# Patient Record
Sex: MALE | Race: ASIAN | NOT HISPANIC OR LATINO | URBAN - METROPOLITAN AREA
[De-identification: names, ages, dates, MRNs, and addresses within clinical notes are randomized per-mention and may not be internally consistent; named-entity substitution may affect disease eponyms.]

---

## 2017-11-13 ENCOUNTER — EMERGENCY (EMERGENCY)
Facility: HOSPITAL | Age: 22
LOS: 1 days | Discharge: HOME | End: 2017-11-13
Attending: EMERGENCY MEDICINE | Admitting: EMERGENCY MEDICINE
Payer: MEDICAID

## 2017-11-13 VITALS
RESPIRATION RATE: 14 BRPM | OXYGEN SATURATION: 100 % | TEMPERATURE: 98 F | HEART RATE: 63 BPM | SYSTOLIC BLOOD PRESSURE: 127 MMHG | DIASTOLIC BLOOD PRESSURE: 67 MMHG

## 2017-11-13 PROCEDURE — 12002 RPR S/N/AX/GEN/TRNK2.6-7.5CM: CPT

## 2017-11-13 PROCEDURE — 99284 EMERGENCY DEPT VISIT MOD MDM: CPT | Mod: 25

## 2017-11-13 RX ORDER — TETANUS TOXOID, REDUCED DIPHTHERIA TOXOID AND ACELLULAR PERTUSSIS VACCINE, ADSORBED 5; 2.5; 8; 8; 2.5 [IU]/.5ML; [IU]/.5ML; UG/.5ML; UG/.5ML; UG/.5ML
0.5 SUSPENSION INTRAMUSCULAR ONCE
Qty: 0 | Refills: 0 | Status: COMPLETED | OUTPATIENT
Start: 2017-11-13 | End: 2017-11-13

## 2017-11-13 RX ORDER — CEPHALEXIN 500 MG
1 CAPSULE ORAL
Qty: 10 | Refills: 0 | OUTPATIENT
Start: 2017-11-13 | End: 2017-11-18

## 2017-11-13 RX ORDER — CEPHALEXIN 500 MG
500 CAPSULE ORAL ONCE
Qty: 0 | Refills: 0 | Status: COMPLETED | OUTPATIENT
Start: 2017-11-13 | End: 2017-11-13

## 2017-11-13 RX ORDER — IBUPROFEN 200 MG
600 TABLET ORAL ONCE
Qty: 0 | Refills: 0 | Status: COMPLETED | OUTPATIENT
Start: 2017-11-13 | End: 2017-11-13

## 2017-11-13 RX ADMIN — TETANUS TOXOID, REDUCED DIPHTHERIA TOXOID AND ACELLULAR PERTUSSIS VACCINE, ADSORBED 0.5 MILLILITER(S): 5; 2.5; 8; 8; 2.5 SUSPENSION INTRAMUSCULAR at 12:33

## 2017-11-13 RX ADMIN — Medication 500 MILLIGRAM(S): at 13:05

## 2017-11-13 RX ADMIN — Medication 600 MILLIGRAM(S): at 13:05

## 2017-11-13 RX ADMIN — Medication 600 MILLIGRAM(S): at 12:33

## 2017-11-13 NOTE — ED PROVIDER NOTE - PROGRESS NOTE DETAILS
ENID ZHOU Tiberio- left 2nd digit volar surface with three 5-0 sutures placed under sterile technique. No complications. Patient tolerated well. Heavily irrigated with NS, numbed with lido 1%.  DC home with Keflex prophylaxis due to nature of wound, wound care instructions. Tdap administered. Patient to return in 7 days for suture removal.

## 2017-11-13 NOTE — ED PROVIDER NOTE - CARE PLAN
Principal Discharge DX:	Laceration of finger, left, initial encounter  Instructions for follow-up, activity and diet:	Follow up with your PMD within 48-72 hours for wound check. Keep sutures covered and dry for 24 hours then clean with soap and water daily.  Apply bacitracin and cover.  Return to ED for suture removal 7 days. Take keflex 500mg 1 tab 2x/day for 5 days to prevent infection. Take Tylenol 650mg every 4-6 hours as needed for pain. Any increased pain, redness, streaking (red lines), swelling, fever, chills OR ANY NEW CONCERNING SYMPTOMS return to the emergency department. You received a Tdap shot today (Tetanus). Principal Discharge DX:	Laceration of finger, left, initial encounter  Instructions for follow-up, activity and diet:	Follow up with your PMD within 48-72 hours for wound check. Keep sutures covered and dry for 24 hours then clean with soap and water daily.  Apply bacitracin and cover.  Return to ED for suture removal 7 days. Take keflex 500mg 1 tab 2x/day for 5 days to prevent infection. Take Tylenol 650mg every 4-6 hours as needed for pain. Any increased pain, redness, streaking (red lines), swelling, fever, chills OR ANY NEW CONCERNING SYMPTOMS return to the emergency department. You received a Tdap shot today (Tetanus).  Secondary Diagnosis:	Finger pain, left

## 2017-11-13 NOTE — ED PROVIDER NOTE - MUSCULOSKELETAL, MLM
Spine appears normal, range of motion is not limited, no muscle or joint tenderness except as noted by laceration.

## 2017-11-13 NOTE — ED PROVIDER NOTE - SKIN WOUND DESCRIPTION
left 2nd digit w/ semi linear laceration overlying the volar DIP, appears dirty, no active bleeding, sensation intact, pulses normal, cap refill less than 2 seconds left 2nd digit w/ semi linear 1.5cm laceration overlying the volar Distal Phalanx, appears dirty, no active bleeding, sensation intact, pulses normal, cap refill less than 2 seconds, tendon function intact left 2nd digit w/ semi linear 3cm laceration overlying the volar Distal Phalanx, edges superficial, appears dirty, no active bleeding, sensation intact, pulses normal, cap refill less than 2 seconds, tendon function intact, no tendon or bone visible

## 2017-11-13 NOTE — ED PROVIDER NOTE - PLAN OF CARE
Follow up with your PMD within 48-72 hours for wound check. Keep sutures covered and dry for 24 hours then clean with soap and water daily.  Apply bacitracin and cover.  Return to ED for suture removal 7 days. Take keflex 500mg 1 tab 2x/day for 5 days to prevent infection. Take Tylenol 650mg every 4-6 hours as needed for pain. Any increased pain, redness, streaking (red lines), swelling, fever, chills OR ANY NEW CONCERNING SYMPTOMS return to the emergency department. You received a Tdap shot today (Tetanus).

## 2017-11-13 NOTE — ED PROVIDER NOTE - OBJECTIVE STATEMENT
21 y/o M w/ no significant PMHx, presents to the ED c/o left 2nd digit lac s/p trauma at work. Pt states he was changing a tire and sustained lac on metal caliber. Pt works as . Denies fever, chills or any other complaints. NKDA. Tetanus not UTD. 21 y/o M w/ no significant PMHx, presents to the ED c/o left 2nd digit lac s/p trauma at work. Pt states he was changing a tire and sustained lac on metal caliber. Bled on initial injury, achieved hemostasis prior to ed eval. No limit to rom, pain localized to region of lac, Pt works as . Denies fever, chills, numbness, or any other complaints. NKDA. Tetanus not UTD.

## 2017-11-16 RX ORDER — CEPHALEXIN 500 MG
1 CAPSULE ORAL
Qty: 10 | Refills: 0 | OUTPATIENT
Start: 2017-11-16 | End: 2017-11-21

## 2017-11-20 ENCOUNTER — EMERGENCY (EMERGENCY)
Facility: HOSPITAL | Age: 22
LOS: 1 days | Discharge: HOME | End: 2017-11-20
Attending: EMERGENCY MEDICINE | Admitting: EMERGENCY MEDICINE

## 2017-11-20 VITALS
RESPIRATION RATE: 18 BRPM | HEART RATE: 76 BPM | SYSTOLIC BLOOD PRESSURE: 133 MMHG | TEMPERATURE: 99 F | DIASTOLIC BLOOD PRESSURE: 60 MMHG

## 2017-11-20 NOTE — ED PROVIDER NOTE - OBJECTIVE STATEMENT
23 y/o M w/ no significant PMHx, presents to the ED for suture removal. Pt was seen in ED on 11/13/17 for left 2nd digit laceration sustained while changing tire at work. Pt had 3 sutures placed and instructed to return today for removal. Tetanus UTD. Denies fever, chills, discharge or any other complaints. NKDA.

## 2017-11-20 NOTE — ED PROVIDER NOTE - MEDICAL DECISION MAKING DETAILS
21 y/o M presents to the ED for suture removal. No signs of infection. Sutures on pad of finger, not overlying joint space so 7 days is efficient for healing. Will remove sutures and dc home.

## 2017-11-20 NOTE — ED PROVIDER NOTE - SKIN WOUND DESCRIPTION
sutures c/d/i on left index finger, no signs of infection, no erythema, no drainage, sutures don't overly joint space sutures clean, dry and intact on left index finger, no signs of infection, no erythema, no drainage, sutures don't overly joint space

## 2017-12-23 ENCOUNTER — EMERGENCY (EMERGENCY)
Facility: HOSPITAL | Age: 22
LOS: 1 days | Discharge: HOME | End: 2017-12-23
Attending: EMERGENCY MEDICINE | Admitting: EMERGENCY MEDICINE
Payer: MEDICAID

## 2017-12-23 VITALS
DIASTOLIC BLOOD PRESSURE: 90 MMHG | RESPIRATION RATE: 20 BRPM | SYSTOLIC BLOOD PRESSURE: 150 MMHG | OXYGEN SATURATION: 99 % | HEART RATE: 76 BPM

## 2017-12-23 PROCEDURE — 99283 EMERGENCY DEPT VISIT LOW MDM: CPT

## 2017-12-23 RX ORDER — METRONIDAZOLE 500 MG
1 TABLET ORAL
Qty: 30 | Refills: 0 | OUTPATIENT
Start: 2017-12-23 | End: 2018-01-01

## 2017-12-23 RX ORDER — METRONIDAZOLE 500 MG
500 TABLET ORAL ONCE
Qty: 0 | Refills: 0 | Status: COMPLETED | OUTPATIENT
Start: 2017-12-23 | End: 2017-12-23

## 2017-12-23 RX ADMIN — Medication 500 MILLIGRAM(S): at 15:03

## 2017-12-23 NOTE — ED PROVIDER NOTE - OBJECTIVE STATEMENT
[age] yo M, c/o 1wk nonbloody, watery stools.  Decreased appetite.  No abdominal pain.  No F/C.  No MHx.  Intensity:  8 BMs/day.  Quality:  yellowish/brown.  Associated Sx:  some abdominal cramping after eating food, which seems to cause the diarrhea to worsen.  No difficulty tolerating PO liquids.

## 2017-12-23 NOTE — ED PROVIDER NOTE - MEDICAL DECISION MAKING DETAILS
nonbloody diarrhea in adult M, recent PO keflex use for finger laceration, no severe abd pain, normal VS, and no evidence of significant dehydration.  HPI concerning for c.diff., Plan:  empiric Tx with flagyl for possible c.diff, avoid loperamide, as this may worsen symptoms.

## 2019-09-06 ENCOUNTER — EMERGENCY (EMERGENCY)
Facility: HOSPITAL | Age: 24
LOS: 1 days | Discharge: HOME | End: 2019-09-06
Attending: EMERGENCY MEDICINE | Admitting: EMERGENCY MEDICINE
Payer: MEDICAID

## 2019-09-06 VITALS
OXYGEN SATURATION: 97 % | HEART RATE: 94 BPM | DIASTOLIC BLOOD PRESSURE: 84 MMHG | TEMPERATURE: 99 F | RESPIRATION RATE: 18 BRPM | SYSTOLIC BLOOD PRESSURE: 124 MMHG

## 2019-09-06 PROCEDURE — 99284 EMERGENCY DEPT VISIT MOD MDM: CPT

## 2019-09-06 RX ORDER — FAMOTIDINE 10 MG/ML
20 INJECTION INTRAVENOUS ONCE
Refills: 0 | Status: COMPLETED | OUTPATIENT
Start: 2019-09-06 | End: 2019-09-06

## 2019-09-06 RX ORDER — IBUPROFEN 200 MG
600 TABLET ORAL ONCE
Refills: 0 | Status: COMPLETED | OUTPATIENT
Start: 2019-09-06 | End: 2019-09-06

## 2019-09-06 RX ORDER — ONDANSETRON 8 MG/1
4 TABLET, FILM COATED ORAL ONCE
Refills: 0 | Status: COMPLETED | OUTPATIENT
Start: 2019-09-06 | End: 2019-09-06

## 2019-09-06 RX ORDER — SODIUM CHLORIDE 9 MG/ML
1000 INJECTION, SOLUTION INTRAVENOUS ONCE
Refills: 0 | Status: COMPLETED | OUTPATIENT
Start: 2019-09-06 | End: 2019-09-06

## 2019-09-06 RX ORDER — ONDANSETRON 8 MG/1
1 TABLET, FILM COATED ORAL
Qty: 15 | Refills: 0
Start: 2019-09-06

## 2019-09-06 RX ADMIN — ONDANSETRON 4 MILLIGRAM(S): 8 TABLET, FILM COATED ORAL at 09:17

## 2019-09-06 RX ADMIN — Medication 600 MILLIGRAM(S): at 09:17

## 2019-09-06 RX ADMIN — SODIUM CHLORIDE 1000 MILLILITER(S): 9 INJECTION, SOLUTION INTRAVENOUS at 09:17

## 2019-09-06 RX ADMIN — FAMOTIDINE 20 MILLIGRAM(S): 10 INJECTION INTRAVENOUS at 09:17

## 2019-09-06 NOTE — ED ADULT NURSE NOTE - NSIMPLEMENTINTERV_GEN_ALL_ED
Implemented All Universal Safety Interventions:  [place] to call system. Call bell, personal items and telephone within reach. Instruct patient to call for assistance. Room bathroom lighting operational. Non-slip footwear when patient is off stretcher. Physically safe environment: no spills, clutter or unnecessary equipment. Stretcher in lowest position, wheels locked, appropriate side rails in place.

## 2019-09-06 NOTE — ED PROVIDER NOTE - PHYSICAL EXAMINATION
VITAL SIGNS: I have reviewed nursing notes and confirm.  CONSTITUTIONAL: non-toxic, well appearing  SKIN: no rash, no petechiae.  EYES: PERRL, EOMI, pink conjunctiva, anicteric  ENT: tongue and uvular midline, no exudates, mucous membranes moderately dry  NECK: Supple; no meningismus, no JVD  CARD: RRR, no murmurs, equal radial pulses bilaterally 2+  RESP: CTAB, no respiratory distress  ABD: Soft, non-tender, non-distended, no peritoneal signs, no CVA tenderness  EXT: Normal ROM x4. No edema. No calf tenderness  NEURO: Alert, oriented. CN2-12 intact, equal strength bilaterally  PSYCH: Cooperative, appropriate.

## 2019-09-06 NOTE — ED PROVIDER NOTE - NSFOLLOWUPINSTRUCTIONS_ED_ALL_ED_FT
Rest and drink plenty of fluids.    Please use 650-1000mg Tylenol (also called acetaminophen) every 6 hours and/or 400-600mg Motrin (also called Advil or ibuprofen) every 6 hours as needed for pain/discomfort/swelling. You can get these without a prescription. Don't use more than 3000mg of Tylenol in any 24-hour period. Make sure your other prescription/over-the-counter medications don't contain any Tylenol so you don't take too much.     Follow up with your primary care physician this week.    Take prescription ondansetron (Zofran) as needed for nausea.     Return immediately with any new or worsening symptoms including abdominal pain, unable to tolerate food or liquids, chest pain, shortness of breath, palpitations, lightheadedness, weakness, severe headache, or any additional concerns.

## 2019-09-06 NOTE — ED ADULT NURSE NOTE - OBJECTIVE STATEMENT
pt received to room intake 3 pt headaches, body aches, chills, cough, generalized abdominal discomfort, inappetence, nausea but no vomiting. pt VSS 20 g placed in L AC no blood work needed at this time. meds ordered and given awaiting further orders Will continue to monitor the pt

## 2019-09-06 NOTE — ED PROVIDER NOTE - CLINICAL SUMMARY MEDICAL DECISION MAKING FREE TEXT BOX
24 year old with no pertinent PMH presents with nausea, vomiting, and diarrhea x 2 days. Likely viral syndrome as multiple mucous membranes involved with subjective fever and body aches with no signs of any focal bacterial infection. No indication for antibiotics at this time. Plan includes IV fluids, symptomatic treatment with ibuprofen and zofran and reassessment with likely discharge.

## 2019-09-06 NOTE — ED PROVIDER NOTE - NS ED ROS FT
Review of Systems    Constitutional: (+) fever, (+) chills  HEENT: (-) dysphagia, (-) hoarseness, (+) nasal congestion  Cardiovascular: (-) chest pain, (-) syncope  Respiratory: (+) cough, (-) shortness of breath  Gastrointestinal: (+) vomiting, (+) diarrhea, (-) abdominal pain  Musculoskeletal: (-) neck pain, (-) back pain, (-) joint pain  Integumentary: (-) rash, (-) edema, (-) wound  Neurological: (+) headache, (-) altered mental status    Except as documented in the HPI, all other systems are negative.

## 2019-09-06 NOTE — ED PROVIDER NOTE - NS HIV RISK FACTOR YES
Recommendations in caring for Camilo:    Headaches--  1. Will set up with neurologist at \[facility]"" [facility].    2. Recommend abortive therapy with  for severe headaches. I will ask Dr. Marc. Do not use more than 2-3 times per week. If headache frequency is great than that, call to discuss starting a preventative mediation.   3. Follow healthy headache habits: drink lots of water (enough to have colorless urine), eat leafy green vegetables and MTV, add magnesium 200 mg and riboflavin 200 mg, obtain at least 8-9 hours of sleep.   4. Recommend regular aerobic activity to reduce stress.   5. Recommend adding breakfast with protein and fat (Susi, Zone, Jerry [employer] Marcus[employer], Balance, Kalin).   6. amitriptyline (ELAVIL) 50 mg. May increase weekly by 25 mg up to 100 mg daily (max 1.7 mg/kg/day).   7. Call if Camilo develops headaches that wake him/her from sleep or develops any worrisome symptoms.     Seborrheic dermatitis--  Recommended ketoconazole cream 2 times daily for at last 4 weeks.   May use topical steroid 1-2 times daily as needed for redness.         
Declined

## 2019-09-06 NOTE — ED PROVIDER NOTE - OBJECTIVE STATEMENT
24 year old with no pertinent PMH presents with nausea, vomiting, and diarrhea x 2 days.  Pt reports 2 episodes of nonbloody nonbilious emesis and 3 episodes of watery diarrhea since yesterday.  Pt also reports subjective fever, nasal congestion, sweating, body aches, and dry cough x 3-4 days.  Denies any chest pain, shortness of breath, sore throat, photophobia, neck pain, abdominal pain, dysuria, or rash.  Tolerating PO liquids, but reports decreased solid food intake secondary to decreased appetite. Denies any pain with oral intake, bloody stools, black tarry stools, or hematemesis. Denies any ill contacts. Denies any recent travel. Denies any antibiotic use. Pt reports taking DayQuil and ibuprofen with temporary relief. Last dose of DayQuil approximately 2 hours ago. Denies any additional complaints.

## 2019-09-06 NOTE — ED PROVIDER NOTE - PATIENT PORTAL LINK FT
You can access the FollowMyHealth Patient Portal offered by [facility] by registering at the following website: [email or web address]. By joining [employer]’s FollowMyHealth portal, you will also be able to view your health information using other applications (apps) compatible with our system.

## 2019-09-06 NOTE — ED ADULT TRIAGE NOTE - CHIEF COMPLAINT QUOTE
Pt arrives c/o multiple medical complaints - headaches, body aches, chills, cough, generalized abdominal discomfort, inappetence, nausea but no vomiting.  Pt w/o known sick contacts or recent travel.  Vitally stable in triage.  No significant PMHx.

## 2019-09-06 NOTE — ED PROVIDER NOTE - ATTENDING CONTRIBUTION TO CARE
Dr. Mendoza: 23 yo male with no sig PMH in ED with 2 days of body aches, subjective fever, nasal congestion and N/V/D.  No CP/SOB or sore throat.  No known sick contacts.  Pt states he is eating less than usual but still able to eat without vomiting.  On exam pt overall well appearing, in NAD, heart RRR, lungs CTAB, abd NTND, extremities without swelling, strength 5/5 in all extremities and skin without rash.  No CVAT bilaterally.

## 2019-09-06 NOTE — ED PROVIDER NOTE - PROGRESS NOTE DETAILS
Bert-PGY1: pt seen and re-evaluated at bedside.  Pt states his symptoms have improved.  Pt comfortable in NAD.  Pt able to tolerate PO without vomiting.  Will prepare for DC.

## 2019-09-07 ENCOUNTER — EMERGENCY (EMERGENCY)
Facility: HOSPITAL | Age: 24
LOS: 1 days | Discharge: HOME | End: 2019-09-07
Attending: EMERGENCY MEDICINE
Payer: MEDICAID

## 2019-09-07 VITALS
DIASTOLIC BLOOD PRESSURE: 78 MMHG | WEIGHT: 149.91 LBS | SYSTOLIC BLOOD PRESSURE: 127 MMHG | OXYGEN SATURATION: 100 % | RESPIRATION RATE: 16 BRPM | HEIGHT: 67 IN | TEMPERATURE: 98 F | HEART RATE: 82 BPM

## 2019-09-07 PROCEDURE — 99282 EMERGENCY DEPT VISIT SF MDM: CPT

## 2019-09-07 PROCEDURE — 99283 EMERGENCY DEPT VISIT LOW MDM: CPT

## 2019-09-07 NOTE — ED PROVIDER NOTE - NSFOLLOWUPINSTRUCTIONS_ED_ALL_ED_FT
You may have post concussive syndrome.    Follow up with the concussion clinic.     Take Ibuprofen or Tylenol for pain.    Continue taking the Zofran.    Return to ED for new or concerning symptoms.

## 2019-09-07 NOTE — ED PROVIDER NOTE - NSFOLLOWUPCLINICS_GEN_ALL_ED_FT
[facility] Specialty Clinics  Neurology  [address] [place] [address] - [address]rd [place] [address]  Phone: [phone]  Fax:     [facility] Sports Medicine  Sports Medicine  [facility] [place] [address]  Phone: [phone]  Fax:     [facility] Concussion Clinic  Pediatric Concussion  [address] [place] [address]  Phone: [phone]  Fax: [phone]  Follow Up Time:

## 2019-09-07 NOTE — ED PROVIDER NOTE - NS_ ATTENDINGSCRIBEDETAILS _ED_A_ED_FT
The scribe's documentation has been prepared under my direction and personally reviewed by me in its entirety. I confirm that the note above accurately reflects all work, treatment, procedures, and medical decision making performed by me.  MAIDA Bolaños MD

## 2019-09-07 NOTE — ED PROVIDER NOTE - ATTENDING CONTRIBUTION TO CARE
23y/o M c/o persistent HA since head injury 2 weeks ago; no focal neurologic deficits; is afebrile, well appearing.  No rash, no nuchal rigidity. Likely post concussive syndrome; no signs/symptoms of acute infectious etiology, no focal neurologic deficits to suggest space occupying intracranial legion; stable for dc, d/w patient instructions for concussion care, and importance of continue evaluation/management as outpatient (pcp, neuro).

## 2019-09-07 NOTE — ED PROVIDER NOTE - SKIN, MLM
Skin normal color for race, warm, dry and intact. No evidence of rash. No laceration, abrasion, or cephalohematoma at site of head strike.

## 2019-09-07 NOTE — ED PROVIDER NOTE - PATIENT PORTAL LINK FT
You can access the FollowMyHealth Patient Portal offered by [facility] by registering at the following website: [email or web address]. By joining "[employer]’s FollowMyHealth portal, you will also be able to view your health information using other applications (apps) compatible with our system.

## 2019-09-07 NOTE — ED PROVIDER NOTE - OBJECTIVE STATEMENT
25 y/o male with no significant pmhx c/o pain at top of head today. +Bilateral neck, shoulder, and arm pain, anorexia, and nausea. Pt is a ; states he hit the top of his head on a car part while working x2 weeks ago. Denies LOC or lacerations, pt was able to continue working. For the past week, pt had viral sxs including fever, nausea, diarrhea and cold sweats. Pt went to [facility] yesterday and was given antinausea medication with no improvement. States most sxs has improved however pt still has nausea. Pt is eating less but is drinking tea. Endorses Motrin 2 tabs. Denies visual changes, urinary sxs, numbness, tingling, abd pain, or CP. NKDA. Pt states he feel safe at home and work, no SI, no HI.

## 2019-09-07 NOTE — ED PROVIDER NOTE - MUSCULOSKELETAL, MLM
Spine appears normal, no midline spinal tenderness. range of motion is not limited, no muscle or joint tenderness

## 2019-09-07 NOTE — ED ADULT NURSE NOTE - OBJECTIVE STATEMENT
24 y m came to the ed for headache. patient states he was diagnosed with a concussion at [facility] yesterday. was told to follow up with out patient md. patient said he still had a headache so he came to the ed. ambulatory without assistance. able to move all extremities. equal strength and sensation in all extremities. c/o minor nausea. skin is warm and dry. perrl.

## 2019-09-07 NOTE — ED PROVIDER NOTE - NEUROLOGICAL, MLM
CN II-XII grossly intact. Negative Pronator. Normal gait. Strength and sensation 5/5 bilaterally. Pt A&Ox4.

## 2019-09-07 NOTE — ED PROVIDER NOTE - NEURO NEGATIVE STATEMENT, MLM
no numbness, no tingling, loss of consciousness, no gait abnormality, no headache, no sensory deficits, and no weakness.

## 2019-09-07 NOTE — ED PROVIDER NOTE - CLINICAL SUMMARY MEDICAL DECISION MAKING FREE TEXT BOX
Haverty PGY2- viral syndrome this past week, nausea but tolerating liquids, post concussive sx from [facility] 2 weeks ago, possible component of daily marijuana use, no fever, no abd/chest pain, urinating without issue  normal neuro exam, lungs cta, heart rrr, PERRL, full ROM neck  otc pain meds, zofran PRN at home  concussion clinic referral given

## 2019-09-07 NOTE — ED ADULT NURSE NOTE - NSIMPLEMENTINTERV_GEN_ALL_ED
Implemented All Universal Safety Interventions:  [place] to call system. Call bell, personal items and telephone within reach. Instruct patient to call for assistance. Room bathroom lighting operational. Non-slip footwear when patient is off stretcher. Physically safe environment: no spills, clutter or unnecessary equipment. Stretcher in lowest position, wheels locked, appropriate side rails in place.

## 2019-09-09 ENCOUNTER — EMERGENCY (EMERGENCY)
Facility: HOSPITAL | Age: 24
LOS: 1 days | Discharge: HOME | End: 2019-09-09
Attending: EMERGENCY MEDICINE
Payer: MEDICAID

## 2019-09-09 VITALS
DIASTOLIC BLOOD PRESSURE: 81 MMHG | SYSTOLIC BLOOD PRESSURE: 117 MMHG | RESPIRATION RATE: 16 BRPM | OXYGEN SATURATION: 100 % | HEART RATE: 80 BPM | TEMPERATURE: 98 F

## 2019-09-09 VITALS
TEMPERATURE: 99 F | HEART RATE: 86 BPM | WEIGHT: 154.98 LBS | OXYGEN SATURATION: 99 % | SYSTOLIC BLOOD PRESSURE: 122 MMHG | HEIGHT: 67 IN | DIASTOLIC BLOOD PRESSURE: 73 MMHG | RESPIRATION RATE: 16 BRPM

## 2019-09-09 LAB
ALBUMIN SERPL ELPH-MCNC: 3.9 G/DL — SIGNIFICANT CHANGE UP (ref 3.3–5)
ALP SERPL-CCNC: 70 U/L — SIGNIFICANT CHANGE UP (ref 40–120)
ALT FLD-CCNC: 14 U/L — SIGNIFICANT CHANGE UP (ref 10–45)
ANION GAP SERPL CALC-SCNC: 11 MMOL/L — SIGNIFICANT CHANGE UP (ref 5–17)
AST SERPL-CCNC: 20 U/L — SIGNIFICANT CHANGE UP (ref 10–40)
BASOPHILS # BLD AUTO: 0.1 K/UL — SIGNIFICANT CHANGE UP (ref 0–0.2)
BASOPHILS NFR BLD AUTO: 1 % — SIGNIFICANT CHANGE UP (ref 0–2)
BILIRUB SERPL-MCNC: 0.5 MG/DL — SIGNIFICANT CHANGE UP (ref 0.2–1.2)
BUN SERPL-MCNC: 4 MG/DL — LOW (ref 7–23)
CALCIUM SERPL-MCNC: 9.9 MG/DL — SIGNIFICANT CHANGE UP (ref 8.4–10.5)
CHLORIDE SERPL-SCNC: 97 MMOL/L — SIGNIFICANT CHANGE UP (ref 96–108)
CO2 SERPL-SCNC: 29 MMOL/L — SIGNIFICANT CHANGE UP (ref 22–31)
CREAT SERPL-MCNC: 0.82 MG/DL — SIGNIFICANT CHANGE UP (ref 0.5–1.3)
EOSINOPHIL # BLD AUTO: 0.1 K/UL — SIGNIFICANT CHANGE UP (ref 0–0.5)
EOSINOPHIL NFR BLD AUTO: 1.6 % — SIGNIFICANT CHANGE UP (ref 0–6)
GLUCOSE SERPL-MCNC: 105 MG/DL — HIGH (ref 70–99)
HCT VFR BLD CALC: 43.8 % — SIGNIFICANT CHANGE UP (ref 39–50)
HGB BLD-MCNC: 13.7 G/DL — SIGNIFICANT CHANGE UP (ref 13–17)
LYMPHOCYTES # BLD AUTO: 1.4 K/UL — SIGNIFICANT CHANGE UP (ref 1–3.3)
LYMPHOCYTES # BLD AUTO: 15 % — SIGNIFICANT CHANGE UP (ref 13–44)
MCHC RBC-ENTMCNC: 30.3 PG — SIGNIFICANT CHANGE UP (ref 27–34)
MCHC RBC-ENTMCNC: 31.4 GM/DL — LOW (ref 32–36)
MCV RBC AUTO: 96.5 FL — SIGNIFICANT CHANGE UP (ref 80–100)
MONOCYTES # BLD AUTO: 0.3 K/UL — SIGNIFICANT CHANGE UP (ref 0–0.9)
MONOCYTES NFR BLD AUTO: 2.8 % — SIGNIFICANT CHANGE UP (ref 2–14)
NEUTROPHILS # BLD AUTO: 7.4 K/UL — SIGNIFICANT CHANGE UP (ref 1.8–7.4)
NEUTROPHILS NFR BLD AUTO: 79.7 % — HIGH (ref 43–77)
PLATELET # BLD AUTO: 381 K/UL — SIGNIFICANT CHANGE UP (ref 150–400)
POTASSIUM SERPL-MCNC: 3.2 MMOL/L — LOW (ref 3.5–5.3)
POTASSIUM SERPL-SCNC: 3.2 MMOL/L — LOW (ref 3.5–5.3)
PROT SERPL-MCNC: 8.3 G/DL — SIGNIFICANT CHANGE UP (ref 6–8.3)
RBC # BLD: 4.54 M/UL — SIGNIFICANT CHANGE UP (ref 4.2–5.8)
RBC # FLD: 11.8 % — SIGNIFICANT CHANGE UP (ref 10.3–14.5)
SODIUM SERPL-SCNC: 137 MMOL/L — SIGNIFICANT CHANGE UP (ref 135–145)
WBC # BLD: 9.3 K/UL — SIGNIFICANT CHANGE UP (ref 3.8–10.5)
WBC # FLD AUTO: 9.3 K/UL — SIGNIFICANT CHANGE UP (ref 3.8–10.5)

## 2019-09-09 PROCEDURE — 70450 CT HEAD/BRAIN W/O DYE: CPT

## 2019-09-09 PROCEDURE — 96374 THER/PROPH/DIAG INJ IV PUSH: CPT

## 2019-09-09 PROCEDURE — 80053 COMPREHEN METABOLIC PANEL: CPT

## 2019-09-09 PROCEDURE — 85027 COMPLETE CBC AUTOMATED: CPT

## 2019-09-09 PROCEDURE — 99284 EMERGENCY DEPT VISIT MOD MDM: CPT

## 2019-09-09 PROCEDURE — 99284 EMERGENCY DEPT VISIT MOD MDM: CPT | Mod: 25

## 2019-09-09 PROCEDURE — 70450 CT HEAD/BRAIN W/O DYE: CPT | Mod: 26

## 2019-09-09 RX ORDER — KETOROLAC TROMETHAMINE 30 MG/ML
15 SYRINGE (ML) INJECTION ONCE
Refills: 0 | Status: DISCONTINUED | OUTPATIENT
Start: 2019-09-09 | End: 2019-09-09

## 2019-09-09 RX ADMIN — Medication 15 MILLIGRAM(S): at 11:00

## 2019-09-09 RX ADMIN — Medication 15 MILLIGRAM(S): at 10:15

## 2019-09-09 NOTE — ED PROVIDER NOTE - PATIENT PORTAL LINK FT
You can access the FollowMyHealth Patient Portal offered by [facility] by registering at the following website: [email or web address]. By joining [employer]’s FollowMyHealth portal, you will also be able to view your health information using other applications (apps) compatible with our system.

## 2019-09-09 NOTE — ED PROVIDER NOTE - NSFOLLOWUPCLINICS_GEN_ALL_ED_FT
[facility] Specialty Clinics  Neurology  [address] [place] [address] [address]rd [place] [address]  Phone: [phone]  Fax:   Follow Up Time:
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
## 2019-09-09 NOTE — ED PROVIDER NOTE - CLINICAL SUMMARY MEDICAL DECISION MAKING FREE TEXT BOX
23y/o M with persistent HA after head injury; likely post concussive syndrome; no signs/symptoms to suggest acute infectious etiology, FROM of neck (no c spine tenderness, no nuchal rigidity), normal neuro exam; however, 3rd ED visit for similar complaints; concern now for brain mass or mass effect: will obtain CT Head to further evaluate; if no acute findings, can be followed up as outpatient with neurology.

## 2019-09-09 NOTE — ED PROVIDER NOTE - PROGRESS NOTE DETAILS
Attending note (Miky): patient reports feeling better; remains neurologically intact, CT head neg, stable for dc.

## 2019-09-09 NOTE — ED PROVIDER NOTE - MUSCULOSKELETAL, MLM
Spine appears normal, range of motion is not limited, no muscle or joint tenderness.  FROM of neck, no midline c spine tenderness.

## 2019-09-09 NOTE — ED PROVIDER NOTE - OBJECTIVE STATEMENT
23y/o M with no PMH c/o HA, state he hit top of head on car while working (as ) 2 weeks ago, has had intermittent persistent headache at site of injury since then associatd with some nausea and sensitivity / worsening of HA with bright lights; B/l neck shoulder and arm pain also present but has been improving; pains and HA are improved with advil but then come back.  Seen in ED 2 days ago by me, likely concussion, dc'd with concussion information, but returns today for persistence of symptoms.  No fever/chills, +nausea, no vomiting, no vision changes, no weakness/numbness.

## 2019-09-09 NOTE — ED ADULT NURSE NOTE - OBJECTIVE STATEMENT
24 y.o. Male presents to the ED c/o headache xfew days. Pt reports fixing a car when he lift his head up and hit the bottom of the car on top of his head. Pt reports coming to ED and was told to come back if pain persists. Pt reports pain is worse. A&Ox3. Ambulatory. Neuro intact. Denies numbness/tingling, CP, SOB, N/V/D, urinary/bowel complications, fever/chills. Pt is in no current distress. Comfort and safety provided. Will continue to monitor. Family at bedside. 24 y.o. Male presents to the ED c/o headache xfew days. Pt reports fixing a car when he lift his head up and hit the bottom of the car on top of his head. Pt reports coming to ED and was told to come back if pain persists. Pt reports pain is worse. A&Ox3. Ambulatory. Neuro intact. +photosensitivity. Denies numbness/tingling, CP, SOB, N/V/D, urinary/bowel complications, fever/chills. Pt is in no current distress. Comfort and safety provided. Will continue to monitor. Family at bedside.

## 2019-09-09 NOTE — ED PROVIDER NOTE - ENMT, MLM
Airway patent, Nasal mucosa clear. Mouth with normal mucosa. Throat has no vesicles, no oropharyngeal exudates and uvula is midline.  Normal TM b/l, no blood in external auditory canals, no hemotympanum, no soumya-auricular ecchymoses.

## 2019-09-09 NOTE — ED PROVIDER NOTE - NEUROLOGICAL, MLM
Alert and oriented, no focal deficits, no motor or sensory deficits.  CN II-XII intact.  Normal strength and sensation in all 4 extremities.  Normal coordination.  ambulating with normal gait.

## 2019-09-16 ENCOUNTER — APPOINTMENT (OUTPATIENT)
Dept: NEUROLOGY | Facility: CLINIC | Age: 24
End: 2019-09-16
Payer: MEDICAID

## 2019-09-16 VITALS
WEIGHT: 155 LBS | BODY MASS INDEX: 24.33 KG/M2 | SYSTOLIC BLOOD PRESSURE: 126 MMHG | HEIGHT: 67 IN | DIASTOLIC BLOOD PRESSURE: 81 MMHG | HEART RATE: 70 BPM

## 2019-09-16 DIAGNOSIS — F07.81 POSTCONCUSSIONAL SYNDROME: ICD-10-CM

## 2019-09-16 DIAGNOSIS — M62.830 MUSCLE SPASM OF BACK: ICD-10-CM

## 2019-09-16 DIAGNOSIS — M62.838 OTHER MUSCLE SPASM: ICD-10-CM

## 2019-09-16 PROCEDURE — 99204 OFFICE O/P NEW MOD 45 MIN: CPT

## 2019-09-16 NOTE — HISTORY OF PRESENT ILLNESS
[FreeTextEntry1] : HPI: 25yo RH man with no significant PMH, here for a recent head trauma.\par \par PMH:\par 3 weeks ago he reported a mild injury to the top of his head: he was working on a car, lifted his head, hit the bottom of the car. No LOC. The area on the center top of his head swelled up. he put ice on it, and the same night he felt chills.\par He continue to work, but a few days later he developed pain on the top-left posterior frontal region, deep and tight, a times sharp, ion and off, but daily.\par He also developed diffuse aches in his body, as a soreness, now remitted. \par He also developed loss of appetite due to nausea for a few days, remitted after hydration and Zofran at the ER.\par He feels a bit more irritable, aggravated by things easily.\par He visited the ER in 2 occasions, CT head negative, basic labs ok. \par \par Sleep: he tended to oversleep at first, now better.\par \par Appetite: improved now, no more nausea. \par \par Motor symptoms: no issues.\par \par B/B: no issues. \par \par Psychiatric symptoms: a little more irritable, but overall ok.\par \par Professional status: \par \par PCP and other physicians:\par -PCP: \par Dr. Mingo Henderson MD\par Family practice physician in [place], [place]\par Address: [address] [place] [address]\par Phone: [phone]\par \par Workup done: CT head, labs, ER visit.\par

## 2019-09-16 NOTE — DATA REVIEWED
[de-identified] : EXAM: CT BRAIN \par \par \par PROCEDURE DATE: 09/09/2019 \par \par \par \par \par INTERPRETATION: HISTORY: Headaches. Headaches on the top of the head. \par Persistent headaches. \par \par Description: A noncontrast head CT was performed. Axial images were \par performed from the skull base to the vertex with coronal/sagittal \par reconstructions. \par \par There is no evidence for acute infarct, calvarial fracture, acute \par hemorrhage, mass effect, or hydrocephalus. \par \par The gray matter white matter junction is well-preserved. \par \par The visualized portions of the paranasal sinuses and mastoid air cells are \par clear. \par \par IMPRESSION: \par \par No acute intracranial pathology is noted. If the patient has persistent \par headaches over time, consider eventual brain MRI imaging follow-up. \par \par \par \par \par \par \par \par \par \par RONALDO RODRÍGUEZ M.D., ATTENDING RADIOLOGIST \par This document has been electronically signed. Sep 9 2019 10:33AM

## 2019-09-16 NOTE — ASSESSMENT
[FreeTextEntry1] : Assessment:\par 25yo RH man with recent head trauma, no LOC, and persistent HA, irritability, muscle spasms.\par Overall improving.\par \par Diagnostic Impression:\par -postconcussion syndrome\par -HA\par -muscle spasms\par \par Plan:\par -gradual return to activities\par -PRN Tizanidine at night-educated about side effects\par -will consider PT if muscle spasm persists. \par

## 2019-09-17 ENCOUNTER — RX RENEWAL (OUTPATIENT)
Age: 24
End: 2019-09-17

## 2019-09-17 RX ORDER — TIZANIDINE HYDROCHLORIDE 2 MG/1
2 CAPSULE ORAL
Qty: 30 | Refills: 0 | Status: DISCONTINUED | COMMUNITY
Start: 2019-09-16 | End: 2019-09-17

## 2019-10-11 ENCOUNTER — RX RENEWAL (OUTPATIENT)
Age: 24
End: 2019-10-11

## 2019-10-11 RX ORDER — TIZANIDINE 2 MG/1
2 TABLET ORAL TWICE DAILY
Qty: 60 | Refills: 0 | Status: ACTIVE | COMMUNITY
Start: 2019-09-17 | End: 1900-01-01

## 2019-11-10 ENCOUNTER — EMERGENCY (EMERGENCY)
Facility: HOSPITAL | Age: 24
LOS: 1 days | Discharge: HOME | End: 2019-11-10
Attending: STUDENT IN AN ORGANIZED HEALTH CARE EDUCATION/TRAINING PROGRAM
Payer: SELF-PAY

## 2019-11-10 VITALS
WEIGHT: 130.07 LBS | OXYGEN SATURATION: 99 % | RESPIRATION RATE: 16 BRPM | HEART RATE: 95 BPM | SYSTOLIC BLOOD PRESSURE: 138 MMHG | DIASTOLIC BLOOD PRESSURE: 94 MMHG | TEMPERATURE: 101 F

## 2019-11-10 VITALS
TEMPERATURE: 99 F | SYSTOLIC BLOOD PRESSURE: 110 MMHG | RESPIRATION RATE: 18 BRPM | HEART RATE: 85 BPM | OXYGEN SATURATION: 97 % | DIASTOLIC BLOOD PRESSURE: 77 MMHG

## 2019-11-10 PROCEDURE — 99283 EMERGENCY DEPT VISIT LOW MDM: CPT

## 2019-11-10 PROCEDURE — 99282 EMERGENCY DEPT VISIT SF MDM: CPT

## 2019-11-10 RX ORDER — ACETAMINOPHEN 500 MG
975 TABLET ORAL ONCE
Refills: 0 | Status: COMPLETED | OUTPATIENT
Start: 2019-11-10 | End: 2019-11-10

## 2019-11-10 RX ORDER — METOCLOPRAMIDE HCL 10 MG
10 TABLET ORAL ONCE
Refills: 0 | Status: COMPLETED | OUTPATIENT
Start: 2019-11-10 | End: 2019-11-10

## 2019-11-10 RX ORDER — DIPHENHYDRAMINE HCL 50 MG
25 CAPSULE ORAL ONCE
Refills: 0 | Status: COMPLETED | OUTPATIENT
Start: 2019-11-10 | End: 2019-11-10

## 2019-11-10 RX ADMIN — Medication 10 MILLIGRAM(S): at 20:51

## 2019-11-10 RX ADMIN — Medication 975 MILLIGRAM(S): at 20:51

## 2019-11-10 RX ADMIN — Medication 25 MILLIGRAM(S): at 20:52

## 2019-11-10 NOTE — ED PROVIDER NOTE - ATTENDING CONTRIBUTION TO CARE
24 M w/ 24 M w/prior hx of concussion, presents to the ED w/ headache, denies any fevers chills, nausea vomiting. reports that for the past 1 week he has had intermittent night sweats. Reports that he thinks it is 2/2 the house being warm. He has no abdominal pain, no hx of HIV, no flank pain. No lightheadedness.   He reports having pain on the top of the head. no chest pain. no shortness of breath. Pt reports that he has mild nasal congestion and cough    I have reviewed the triage vital signs. Const: nontoxic appearing, in nad, playing on cellphone, Well-nourished, Well-developed, negative kernigs and brundiski sign Eyes: no conjunctival injection and no scleral icterus ENMT: Moist mucus membranes, CVS: +S1/S2, radial/DP pulse 2+ bilaterally RESP: Unlabored respiratory effort, Clear to auscultation bilaterally no tachypnea GI: Nontender/Nondistended soft abdomen, no CVA tenderness MSK: Extremities w/o deformity or ttp, Psych: Awake, Alert, & Orientedx3;  Appropriate mood and affect, cooperative  Pt has no tachypnea clear lungs bialterally which seems to be consistent w/ a viral syndrome, no significant nasal congestion to suggest a bacterial sinusitis. Suspect pts fever is 2/2 to a viral syndrome.   Pt has mild head pain, that has completely resolved on my evaluation of the patient. he does report that he has been depressed w/ a 20lb weight loss, decreased PO intake since being dx w/ concussion approx 2 months ago.  no homicidal or suicidal ideation. Pt lives w/ family members. Plan for outpt follow up.  PT was offered labs, however preferred to follow up with primary care doctor. Reports he came to ER for management of headache which has completely resolved

## 2019-11-10 NOTE — ED PROVIDER NOTE - CHPI ED SYMPTOMS NEG
no shortness of breath/no CP, no congestion, no sore throat no CP, no congestion, no sore throat/no vomiting/no shortness of breath

## 2019-11-10 NOTE — ED PROVIDER NOTE - PATIENT PORTAL LINK FT
You can access the FollowMyHealth Patient Portal offered by [facility] by registering at the following website: [email or web address]. By joining [employer]’s FollowMyHealth portal, you will also be able to view your health information using other applications (apps) compatible with our system.

## 2019-11-10 NOTE — ED PROVIDER NOTE - OBJECTIVE STATEMENT
24 year old male with no significant pmhx or pshx presents to the ED c/o sharp, throbbing HA, nausea, dizziness, fevers x1.5 weeks. +diarrhea, weight loss, cough, neck pain, back pain, ear pain. Pt had a concussion x2 months ago, diagnosed at [facility] ED and prescribed Zofran and Tizanidine PRN, symptoms have since resolved. Works as a  and hit head on a car while working on it. Followed up with neurologist after discharge, conducted CT-scan and has since been cleared to return to work. Notes that the present pain on the top of the head is the same location that he hit at the time. Pt has been taking Dayquil, Nyquil and [place] to no relief. Denies recent travel. Denies sick contacts. Denies congestion, CP, SOB, sore throat. No current daily meds. Notes that he has not been eating anything out of the ordinary. 24 year old male with no significant pmhx or pshx presents to the ED c/o sharp, throbbing HA, nausea, dizziness, fevers x1.5 weeks. +diarrhea, weight loss, cough, neck pain, back pain, ear pain. Pt had a concussion x2 months ago, diagnosed at [facility] ED and prescribed Zofran and Tizanidine PRN, symptoms have since resolved. Works as a  and hit head on a car while working on it. Followed up with neurologist after discharge, conducted CT-scan and has since been cleared to return to work. Notes that the present pain on the top of the head is the same location that he hit at the time. Pt has been taking Dayquil, Nyquil and Sierra [place] to no relief. Denies recent travel. Denies sick contacts. Denies congestion, CP, SOB, sore throat, vomiting. No current daily meds. Notes that he has not been eating anything out of the ordinary. 24 year old male with no significant pmhx or pshx presents to the ED c/o sharp, throbbing HA, nausea, dizziness, fevers x1.5 weeks. +diarrhea, weight loss, cough, neck pain, back pain, ear pain. Pt had a concussion x2 months ago, diagnosed at [facility] ED and prescribed Zofran and Tizanidine PRN, symptoms have since resolved. Works as a  and hit head on a car while working on it. Followed up with neurologist after discharge, conducted CT-scan and has since been cleared to return to work. Notes that the present pain on the top of the head is the same location that he hit at the time. Pt has been taking Dayquil, Nyquil and [place] to no relief. Denies recent travel. Denies sick contacts. Denies congestion, CP, SOB, sore throat, vomiting. No current daily meds. Notes that he has not been eating anything out of the ordinary. Fmhx benign brain tumors.

## 2019-11-10 NOTE — ED PROVIDER NOTE - CLINICAL SUMMARY MEDICAL DECISION MAKING FREE TEXT BOX
24 year old male with no pmhx presents to the ED c/o sharp head pain associated with chills, fevers, body aches x1.5 weeks. Not concerned for any intracranial bleed at this time, considering history and physical, not conerned for meningitis given lack of focal deficits. Will maange pain, lower fever then reassess. 24 year old male with no pmhx presents to the ED c/o sharp head pain associated with chills, fevers, body aches x1.5 weeks. Not concerned for any intracranial bleed at this time considering history and physical, not concerned for meningitis given lack of focal deficits. Will manage pain, lower fever then reassess. 24 year old male with no pmhx presents to the ED c/o sharp head pain associated with chills, fevers, body aches x1.5 weeks. Not concerned for any intracranial bleed at this time considering history and physical, not concerned for meningitis given lack of focal deficits.  Pt. appears very comfortable and is seen texting and moving around.  Will manage pain, lower fever then reassess.

## 2019-11-10 NOTE — ED PROVIDER NOTE - PHYSICAL EXAMINATION
General: No acute distress.  Heart: Regular rate and rhythm. No murmurs, rubs, or gallops.  Lungs: CTAB, no wheezes or rhonchi.  Abdomen: Soft, non-tender, non-distended. No organomegaly.  Eyes: PERRL, EOMI.  Neuro: AAOx4, no focal motor or sensory deficits. CN II-XII intact.  Extremities: No lower extremity swelling, 2+ DP and radial pulses.  Skin: No rashes or lesions.

## 2019-11-10 NOTE — ED ADULT NURSE NOTE - OBJECTIVE STATEMENT
25 y/o male A&Ox3 with hx of concussion for headache and dizziness. Pt reports concussion 2 months ago s/p hitting top of head at work, no LOC or head trauma. Was seen multiple times at ED for headache and dizziness, CAT scan results came back negative, pt was diagnosed with concussion and told to follow up with neurologist. Currently pt reports 10 days of intermittent "sharp and throbbing" headache, no blurry vision or trouble seeing. +fevers, chills and nausea x10 days with little relief from Tylenol, DayQuil and Motrin. No dysuria, burning with urination or diarrhea. Denies CP, SOB, weakness, numbness or tingling. Safety measures maintained with side rails up and family at bedside.

## 2019-11-10 NOTE — ED ADULT TRIAGE NOTE - CHIEF COMPLAINT QUOTE
Head injury 2 months ago, dx with a concussion, was on Zofran and Tizanidine PRN, was feeling better and symptoms resolved.   Then in the last week and a half is feeling increased headaches, nausea, dizziness, diarrhea, body aches, fevers, weight loss, night sweats, cough.  No visual changes, vomiting. No sick contacts or recent travel.

## 2019-11-10 NOTE — ED PROVIDER NOTE - NSFOLLOWUPINSTRUCTIONS_ED_ALL_ED_FT
Your headache is likely from your concussion.  Please make sure to follow up with your primary care doctor in 24-48 hours for routine blood work and further care.    Please also follow with your neurologist for your lingering concussion symptoms.    If you develop any vomiting, high fevers, lose consciousness, are unable to tolerate oral intake, or have any other concerning symptoms, please return immediately.

## 2019-11-12 DIAGNOSIS — G44.311 ACUTE POST-TRAUMATIC HEADACHE, INTRACTABLE: ICD-10-CM

## 2019-11-12 DIAGNOSIS — R51 HEADACHE: ICD-10-CM

## 2019-12-03 ENCOUNTER — FORM ENCOUNTER (OUTPATIENT)
Age: 24
End: 2019-12-03

## 2019-12-04 ENCOUNTER — OUTPATIENT (OUTPATIENT)
Dept: OUTPATIENT SERVICES | Facility: HOSPITAL | Age: 24
LOS: 1 days | End: 2019-12-04
Payer: COMMERCIAL

## 2019-12-04 ENCOUNTER — APPOINTMENT (OUTPATIENT)
Dept: MRI IMAGING | Facility: IMAGING CENTER | Age: 24
End: 2019-12-04
Payer: COMMERCIAL

## 2019-12-04 DIAGNOSIS — G44.311 ACUTE POST-TRAUMATIC HEADACHE, INTRACTABLE: ICD-10-CM

## 2019-12-04 DIAGNOSIS — F07.81 POSTCONCUSSIONAL SYNDROME: ICD-10-CM

## 2019-12-04 PROCEDURE — 72141 MRI NECK SPINE W/O DYE: CPT

## 2019-12-04 PROCEDURE — 70551 MRI BRAIN STEM W/O DYE: CPT | Mod: 26

## 2019-12-04 PROCEDURE — 70551 MRI BRAIN STEM W/O DYE: CPT

## 2019-12-04 PROCEDURE — 72141 MRI NECK SPINE W/O DYE: CPT | Mod: 26

## 2020-01-02 ENCOUNTER — APPOINTMENT (OUTPATIENT)
Dept: NEUROLOGY | Facility: HOSPITAL | Age: 25
End: 2020-01-02

## 2020-01-13 NOTE — ED ADULT NURSE NOTE - NS_NURSE_DISC_TEACHING_YN_ED_ALL_ED
Yes Elidel Counseling: Patient may experience a mild burning sensation during topical application. Elidel is not approved in children less than 2 years of age. There have been case reports of hematologic and skin malignancies in patients using topical calcineurin inhibitors although causality is questionable.

## 2020-06-10 NOTE — ED PROVIDER NOTE - NS_RESIDENTSCRIBE_ED_ALL_ED
unk
I personally performed the service described in the documentation recorded by the scribe in my presence, and it accurately and completely records my words and actions.

## 2021-10-19 NOTE — ED PROVIDER NOTE - NS ED ATTENDING STATEMENT MOD
----- Message from Amrik Collins Attending Only

## 2023-01-04 NOTE — ED PROVIDER NOTE - ATTESTATION, MLM
Last Office Visit   10/3/2022  Upcoming: Visit date not found    Last Refill  ferrous gluconate (FERGON) 324 (38 Fe) MG tablet 90 tablet 0 9/27/2022     Sig: TAKE 1 TABLET BY MOUTH DAILY WITH BREAKFAST    Sent to pharmacy as: Ferrous Gluconate 324 (38 Fe) MG Oral Tablet (FERGON)    Class: Eprescribe    E-Prescribing Status: Receipt confirmed by pharmacy (9/27/2022  9:42 AM CDT)        No Protocol  Medication has been pended for provider review.   
I have reviewed and confirmed nurses' notes for patient's medications, allergies, medical history, and surgical history.

## 2023-03-01 NOTE — ED PROVIDER NOTE - CARE PLAN
Head,  normocephalic,  atraumatic,  Face,  Face within normal limits,  Ears,  External ears within normal limits, Principal Discharge DX:	Headache  Secondary Diagnosis:	Post concussive syndrome

## 2023-05-22 NOTE — ED PROVIDER NOTE - CONSTITUTIONAL NEGATIVE STATEMENT, MLM
General Sunscreen Counseling: photoprotection and sunscreen
Detail Level: Detailed
no fever and no chills.

## 2023-06-11 ENCOUNTER — EMERGENCY (EMERGENCY)
Facility: HOSPITAL | Age: 28
LOS: 1 days | Discharge: HOME | End: 2023-06-11
Attending: EMERGENCY MEDICINE | Admitting: EMERGENCY MEDICINE
Payer: MEDICAID

## 2023-06-11 VITALS
OXYGEN SATURATION: 100 % | SYSTOLIC BLOOD PRESSURE: 132 MMHG | TEMPERATURE: 99 F | RESPIRATION RATE: 16 BRPM | DIASTOLIC BLOOD PRESSURE: 67 MMHG | HEART RATE: 90 BPM

## 2023-06-11 LAB
A1C WITH ESTIMATED AVERAGE GLUCOSE RESULT: 5.3 % — SIGNIFICANT CHANGE UP (ref 4–5.6)
ALBUMIN SERPL ELPH-MCNC: 4.5 G/DL — SIGNIFICANT CHANGE UP (ref 3.3–5)
ALP SERPL-CCNC: 76 U/L — SIGNIFICANT CHANGE UP (ref 40–120)
ALT FLD-CCNC: 28 U/L — SIGNIFICANT CHANGE UP (ref 4–41)
ANION GAP SERPL CALC-SCNC: 13 MMOL/L — SIGNIFICANT CHANGE UP (ref 7–14)
AST SERPL-CCNC: 28 U/L — SIGNIFICANT CHANGE UP (ref 4–40)
BASOPHILS # BLD AUTO: 0.04 K/UL — SIGNIFICANT CHANGE UP (ref 0–0.2)
BASOPHILS NFR BLD AUTO: 0.4 % — SIGNIFICANT CHANGE UP (ref 0–2)
BILIRUB SERPL-MCNC: 0.4 MG/DL — SIGNIFICANT CHANGE UP (ref 0.2–1.2)
BUN SERPL-MCNC: 13 MG/DL — SIGNIFICANT CHANGE UP (ref 7–23)
CALCIUM SERPL-MCNC: 9.5 MG/DL — SIGNIFICANT CHANGE UP (ref 8.4–10.5)
CHLORIDE SERPL-SCNC: 103 MMOL/L — SIGNIFICANT CHANGE UP (ref 98–107)
CO2 SERPL-SCNC: 22 MMOL/L — SIGNIFICANT CHANGE UP (ref 22–31)
CREAT SERPL-MCNC: 0.74 MG/DL — SIGNIFICANT CHANGE UP (ref 0.5–1.3)
CRP SERPL-MCNC: 4 MG/L — SIGNIFICANT CHANGE UP
EGFR: 129 ML/MIN/1.73M2 — SIGNIFICANT CHANGE UP
EOSINOPHIL # BLD AUTO: 0.17 K/UL — SIGNIFICANT CHANGE UP (ref 0–0.5)
EOSINOPHIL NFR BLD AUTO: 1.7 % — SIGNIFICANT CHANGE UP (ref 0–6)
ERYTHROCYTE [SEDIMENTATION RATE] IN BLOOD: 21 MM/HR — HIGH (ref 1–15)
ESTIMATED AVERAGE GLUCOSE: 105 — SIGNIFICANT CHANGE UP
GLUCOSE SERPL-MCNC: 98 MG/DL — SIGNIFICANT CHANGE UP (ref 70–99)
HCT VFR BLD CALC: 42.4 % — SIGNIFICANT CHANGE UP (ref 39–50)
HGB BLD-MCNC: 14.5 G/DL — SIGNIFICANT CHANGE UP (ref 13–17)
IANC: 6.36 K/UL — SIGNIFICANT CHANGE UP (ref 1.8–7.4)
IMM GRANULOCYTES NFR BLD AUTO: 0.3 % — SIGNIFICANT CHANGE UP (ref 0–0.9)
LYMPHOCYTES # BLD AUTO: 2.79 K/UL — SIGNIFICANT CHANGE UP (ref 1–3.3)
LYMPHOCYTES # BLD AUTO: 28 % — SIGNIFICANT CHANGE UP (ref 13–44)
MCHC RBC-ENTMCNC: 31.1 PG — SIGNIFICANT CHANGE UP (ref 27–34)
MCHC RBC-ENTMCNC: 34.2 GM/DL — SIGNIFICANT CHANGE UP (ref 32–36)
MCV RBC AUTO: 91 FL — SIGNIFICANT CHANGE UP (ref 80–100)
MONOCYTES # BLD AUTO: 0.57 K/UL — SIGNIFICANT CHANGE UP (ref 0–0.9)
MONOCYTES NFR BLD AUTO: 5.7 % — SIGNIFICANT CHANGE UP (ref 2–14)
NEUTROPHILS # BLD AUTO: 6.36 K/UL — SIGNIFICANT CHANGE UP (ref 1.8–7.4)
NEUTROPHILS NFR BLD AUTO: 63.9 % — SIGNIFICANT CHANGE UP (ref 43–77)
NRBC # BLD: 0 /100 WBCS — SIGNIFICANT CHANGE UP (ref 0–0)
NRBC # FLD: 0 K/UL — SIGNIFICANT CHANGE UP (ref 0–0)
PLATELET # BLD AUTO: 271 K/UL — SIGNIFICANT CHANGE UP (ref 150–400)
POTASSIUM SERPL-MCNC: 3.7 MMOL/L — SIGNIFICANT CHANGE UP (ref 3.5–5.3)
POTASSIUM SERPL-SCNC: 3.7 MMOL/L — SIGNIFICANT CHANGE UP (ref 3.5–5.3)
PROT SERPL-MCNC: 7.5 G/DL — SIGNIFICANT CHANGE UP (ref 6–8.3)
RBC # BLD: 4.66 M/UL — SIGNIFICANT CHANGE UP (ref 4.2–5.8)
RBC # FLD: 12.6 % — SIGNIFICANT CHANGE UP (ref 10.3–14.5)
SODIUM SERPL-SCNC: 138 MMOL/L — SIGNIFICANT CHANGE UP (ref 135–145)
WBC # BLD: 9.96 K/UL — SIGNIFICANT CHANGE UP (ref 3.8–10.5)
WBC # FLD AUTO: 9.96 K/UL — SIGNIFICANT CHANGE UP (ref 3.8–10.5)

## 2023-06-11 PROCEDURE — 73630 X-RAY EXAM OF FOOT: CPT | Mod: 26,RT

## 2023-06-11 PROCEDURE — 99284 EMERGENCY DEPT VISIT MOD MDM: CPT

## 2023-06-11 RX ORDER — IBUPROFEN 200 MG
600 TABLET ORAL ONCE
Refills: 0 | Status: COMPLETED | OUTPATIENT
Start: 2023-06-11 | End: 2023-06-11

## 2023-06-11 RX ADMIN — Medication 600 MILLIGRAM(S): at 03:26

## 2023-06-11 NOTE — ED ADULT TRIAGE NOTE - CHIEF COMPLAINT QUOTE
Patient c/o toe pain for two hours. States toe on L foot is swollen and painful. Took tylenol PTA with some relief. No injury/trauma to toe. No PMH.

## 2023-06-11 NOTE — ED PROVIDER NOTE - NSFOLLOWUPINSTRUCTIONS_ED_ALL_ED_FT
You were seen for toe pain. You do not have a fracture or infection of the toe. Rest, ice, compress and elevate the foot. Return to the ER for increased swelling or redness. Follow up with podiatry at the number below if no one calls you with an appointment from our department.

## 2023-06-11 NOTE — ED PROVIDER NOTE - OBJECTIVE STATEMENT
27-year-old male no past medical history presents with right fourth toe pain that started today.  Works as a .  No trauma to the area, no bites, lacerations.  Took Tylenol at 12 AM prior to arrival. Smokes marijuana and cigarettes. 27-year-old male no past medical history presents with right fourth toe pain that started today.  Works as a .  No trauma to the area, no bites, lacerations.  Took Tylenol at 12 AM prior to arrival. Smokes marijuana and cigarettes.    Attending/Neelam: 28 yo M as descried above, p/w < one day of Rt foot pain. Denies trauma, extensive wearing of boots at work, fever/chills.

## 2023-06-11 NOTE — ED PROVIDER NOTE - NSFOLLOWUPCLINICS_GEN_ALL_ED_FT
[facility]  Podiatry  [address] [place] [address] - [address] [place] [address]  Phone: [phone]  Fax:

## 2023-06-11 NOTE — ED PROVIDER NOTE - PATIENT PORTAL LINK FT
You can access the FollowMyHealth Patient Portal offered by [facility] by registering at the following website: [email or web address]. By joining [employer]’s FollowMyHealth portal, you will also be able to view your health information using other applications (apps) compatible with our system.

## 2023-06-11 NOTE — ED ADULT NURSE NOTE - OBJECTIVE STATEMENT
Pt complaining of 4th toe pain... A&Ox4 ambulatory skin intact. Labs drawn, pain meds given, and xray done.

## 2023-06-11 NOTE — ED PROVIDER NOTE - CLINICAL SUMMARY MEDICAL DECISION MAKING FREE TEXT BOX
26 yo M with atraumatic R fourth toe pain - vss. Tenderness over distal toe with ecchymoses on volar foot. Will rule out fracture vs osteo with XR, labs. Motrin and reassess.

## 2023-06-11 NOTE — ED ADULT NURSE NOTE - NSFALLUNIVINTERV_ED_ALL_ED
Curettage Prior To Excision?: Yes Bed/Stretcher in lowest position, wheels locked, appropriate side rails in place/Call bell, personal items and telephone in reach/Instruct patient to call for assistance before getting out of bed/chair/stretcher/Non-slip footwear applied when patient is off stretcher/[place] to call system/Physically safe environment - no spills, clutter or unnecessary equipment/Purposeful proactive rounding/Room/bathroom lighting operational, light cord in reach

## 2023-06-11 NOTE — ED PROVIDER NOTE - PROGRESS NOTE DETAILS
Boot given to patient - no acute fractures. Patient can ambulate. Will give podiatry follow up.     Lupe Graff MD, PGY2

## 2023-06-17 ENCOUNTER — EMERGENCY (EMERGENCY)
Facility: HOSPITAL | Age: 28
LOS: 1 days | Discharge: HOME | End: 2023-06-17
Attending: EMERGENCY MEDICINE | Admitting: EMERGENCY MEDICINE
Payer: MEDICAID

## 2023-06-17 VITALS
RESPIRATION RATE: 16 BRPM | SYSTOLIC BLOOD PRESSURE: 113 MMHG | TEMPERATURE: 98 F | HEART RATE: 79 BPM | OXYGEN SATURATION: 99 % | DIASTOLIC BLOOD PRESSURE: 85 MMHG

## 2023-06-17 VITALS
DIASTOLIC BLOOD PRESSURE: 99 MMHG | TEMPERATURE: 99 F | OXYGEN SATURATION: 99 % | SYSTOLIC BLOOD PRESSURE: 152 MMHG | RESPIRATION RATE: 18 BRPM | HEART RATE: 99 BPM

## 2023-06-17 LAB
ALBUMIN SERPL ELPH-MCNC: 4.7 G/DL — SIGNIFICANT CHANGE UP (ref 3.3–5)
ALP SERPL-CCNC: 78 U/L — SIGNIFICANT CHANGE UP (ref 40–120)
ALT FLD-CCNC: 17 U/L — SIGNIFICANT CHANGE UP (ref 4–41)
ANION GAP SERPL CALC-SCNC: 13 MMOL/L — SIGNIFICANT CHANGE UP (ref 7–14)
APTT BLD: 30.3 SEC — SIGNIFICANT CHANGE UP (ref 27–36.3)
AST SERPL-CCNC: 15 U/L — SIGNIFICANT CHANGE UP (ref 4–40)
BASOPHILS # BLD AUTO: 0.05 K/UL — SIGNIFICANT CHANGE UP (ref 0–0.2)
BASOPHILS NFR BLD AUTO: 0.5 % — SIGNIFICANT CHANGE UP (ref 0–2)
BILIRUB SERPL-MCNC: 0.7 MG/DL — SIGNIFICANT CHANGE UP (ref 0.2–1.2)
BUN SERPL-MCNC: 11 MG/DL — SIGNIFICANT CHANGE UP (ref 7–23)
CALCIUM SERPL-MCNC: 9.6 MG/DL — SIGNIFICANT CHANGE UP (ref 8.4–10.5)
CHLORIDE SERPL-SCNC: 102 MMOL/L — SIGNIFICANT CHANGE UP (ref 98–107)
CO2 SERPL-SCNC: 28 MMOL/L — SIGNIFICANT CHANGE UP (ref 22–31)
CREAT SERPL-MCNC: 0.88 MG/DL — SIGNIFICANT CHANGE UP (ref 0.5–1.3)
EGFR: 122 ML/MIN/1.73M2 — SIGNIFICANT CHANGE UP
EOSINOPHIL # BLD AUTO: 0.18 K/UL — SIGNIFICANT CHANGE UP (ref 0–0.5)
EOSINOPHIL NFR BLD AUTO: 1.8 % — SIGNIFICANT CHANGE UP (ref 0–6)
GLUCOSE SERPL-MCNC: 94 MG/DL — SIGNIFICANT CHANGE UP (ref 70–99)
HCT VFR BLD CALC: 45.2 % — SIGNIFICANT CHANGE UP (ref 39–50)
HGB BLD-MCNC: 14.7 G/DL — SIGNIFICANT CHANGE UP (ref 13–17)
IANC: 4.8 K/UL — SIGNIFICANT CHANGE UP (ref 1.8–7.4)
IMM GRANULOCYTES NFR BLD AUTO: 0.3 % — SIGNIFICANT CHANGE UP (ref 0–0.9)
INR BLD: 1.14 RATIO — SIGNIFICANT CHANGE UP (ref 0.88–1.16)
LYMPHOCYTES # BLD AUTO: 4.32 K/UL — HIGH (ref 1–3.3)
LYMPHOCYTES # BLD AUTO: 42.4 % — SIGNIFICANT CHANGE UP (ref 13–44)
MCHC RBC-ENTMCNC: 30 PG — SIGNIFICANT CHANGE UP (ref 27–34)
MCHC RBC-ENTMCNC: 32.5 GM/DL — SIGNIFICANT CHANGE UP (ref 32–36)
MCV RBC AUTO: 92.2 FL — SIGNIFICANT CHANGE UP (ref 80–100)
MONOCYTES # BLD AUTO: 0.82 K/UL — SIGNIFICANT CHANGE UP (ref 0–0.9)
MONOCYTES NFR BLD AUTO: 8 % — SIGNIFICANT CHANGE UP (ref 2–14)
NEUTROPHILS # BLD AUTO: 4.8 K/UL — SIGNIFICANT CHANGE UP (ref 1.8–7.4)
NEUTROPHILS NFR BLD AUTO: 47 % — SIGNIFICANT CHANGE UP (ref 43–77)
NRBC # BLD: 0 /100 WBCS — SIGNIFICANT CHANGE UP (ref 0–0)
NRBC # FLD: 0 K/UL — SIGNIFICANT CHANGE UP (ref 0–0)
PLATELET # BLD AUTO: 355 K/UL — SIGNIFICANT CHANGE UP (ref 150–400)
POTASSIUM SERPL-MCNC: 4 MMOL/L — SIGNIFICANT CHANGE UP (ref 3.5–5.3)
POTASSIUM SERPL-SCNC: 4 MMOL/L — SIGNIFICANT CHANGE UP (ref 3.5–5.3)
PROT SERPL-MCNC: 7.6 G/DL — SIGNIFICANT CHANGE UP (ref 6–8.3)
PROTHROM AB SERPL-ACNC: 13.2 SEC — SIGNIFICANT CHANGE UP (ref 10.5–13.4)
RBC # BLD: 4.9 M/UL — SIGNIFICANT CHANGE UP (ref 4.2–5.8)
RBC # FLD: 12.7 % — SIGNIFICANT CHANGE UP (ref 10.3–14.5)
SODIUM SERPL-SCNC: 143 MMOL/L — SIGNIFICANT CHANGE UP (ref 135–145)
WBC # BLD: 10.2 K/UL — SIGNIFICANT CHANGE UP (ref 3.8–10.5)
WBC # FLD AUTO: 10.2 K/UL — SIGNIFICANT CHANGE UP (ref 3.8–10.5)

## 2023-06-17 PROCEDURE — 99285 EMERGENCY DEPT VISIT HI MDM: CPT

## 2023-06-17 PROCEDURE — 73660 X-RAY EXAM OF TOE(S): CPT | Mod: 26,RT

## 2023-06-17 RX ORDER — ACETAMINOPHEN 500 MG
975 TABLET ORAL ONCE
Refills: 0 | Status: COMPLETED | OUTPATIENT
Start: 2023-06-17 | End: 2023-06-17

## 2023-06-17 RX ORDER — MORPHINE SULFATE 50 MG/1
1 CAPSULE, EXTENDED RELEASE ORAL
Qty: 9 | Refills: 0
Start: 2023-06-17 | End: 2023-06-19

## 2023-06-17 RX ADMIN — Medication 975 MILLIGRAM(S): at 20:35

## 2023-06-17 NOTE — ED ADULT NURSE NOTE - NSFALLUNIVINTERV_ED_ALL_ED
Bed/Stretcher in lowest position, wheels locked, appropriate side rails in place/Call bell, personal items and telephone in reach/Instruct patient to call for assistance before getting out of bed/chair/stretcher/Non-slip footwear applied when patient is off stretcher/[place] to call system/Physically safe environment - no spills, clutter or unnecessary equipment/Purposeful proactive rounding/Room/bathroom lighting operational, light cord in reach

## 2023-06-17 NOTE — ED PROVIDER NOTE - PATIENT PORTAL LINK FT
You can access the FollowMyHealth Patient Portal offered by [facility] by registering at the following website: [email or web address]. By joining [employer]’s FollowMyHealth portal, you will also be able to view your health information using other applications (apps) compatible with our system.

## 2023-06-17 NOTE — CONSULT NOTE ADULT - ASSESSMENT
27M presents with right foot plantar fourth digit sulcus wound to subq.  - Pt seen and evaluated.  - Afebrile, WBC 10.2, ESR 23, CRP <0.3.  - Right foot plantar fourth digit sulcus wound to subq, mild sanguinous drainage, scant purulence, no erythema, no edema, mild discoloration to the submet 4 area. Left foot no wounds, no acute signs of infection.  - Right Foot X-Rays: No fracture, no osteomyelitis.  - After gaining verbal consent, anesthetized the right foot fourth digit with 20mL of 1% lidocaine plain. Used a sterile suture removal kit to explore the area revealing <1mL of purulence. Used a sterile #10 blade to make a stab incision down to the level of but not beyond subq in the right foot submet 1 area revealing no further purulence. The areas were copiously flushed with sterile saline solution and packed with 1/4 inch iodoform packing, 4x4 gauze, abd pad, and nisha.  - Recommend daily dressing changes with 1/4 inch iodoform packing, sterile 4x4 gauze, and nisha.  - Recommend 1 week PO doxycycline.  - Recommend crutches to aid with weightbearing as tolerated to the right foot in a DARCO post-op shoe.  - Pt to immediately return to ED if further purulence, redness, or inflammation occurs to the right foot or if there are any other acute issues.  - Pt is stable for discharge from the podiatry standpoint and can followup with Dr. Daniel Uriostegui ([phone]) outpatient on Tuesday 6/20/23.  -Discussed with attending. 27M presents with right foot plantar fourth digit sulcus wound to subq.  - Pt seen and evaluated.  - Afebrile, WBC 10.2, ESR 23, CRP <0.3.  - Right foot plantar fourth digit sulcus wound to subq, mild sanguinous drainage, scant purulence, no erythema, no edema, mild discoloration to the submet 4 area. Left foot no wounds, no acute signs of infection.  - Right Foot X-Rays: No fracture, no osteomyelitis.  - After gaining verbal consent, anesthetized the right foot fourth digit with 20mL of 1% lidocaine plain. Used a sterile suture removal kit to explore the area revealing <1mL of purulence. Used a sterile #10 blade to make a stab incision down to the level of but not beyond subq in the right foot submet 1 area revealing no further purulence. The areas were copiously flushed with sterile saline solution and packed with 1/4 inch iodoform packing, 4x4 gauze, abd pad, and nisha.  - Recommend keeping dressing clean and dry with daily dressing changes with 1/4 inch iodoform packing, sterile 4x4 gauze, and nisha.  - Recommend 1 week PO doxycycline.  - Recommend crutches to aid with weightbearing as tolerated to the right foot in a DARCO post-op shoe.  - Pt to immediately return to ED if further purulence, redness, or inflammation occurs to the right foot or if there are any other acute issues.  - Pt is stable for discharge from the podiatry standpoint and can followup with Dr. Daniel Uriostegui ([phone]) outpatient on Tuesday 6/20/23.  -Discussed with attending. 27M presents with right foot plantar fourth digit sulcus wound to subq.  - Pt seen and evaluated.  - Afebrile, WBC 10.2, ESR 23, CRP <0.3.  - Right foot plantar fourth digit sulcus wound to subq, mild sanguinous drainage, scant purulence, no erythema, no edema, mild discoloration to the submet 4 area. Left foot no wounds, no acute signs of infection.  - Right Foot X-Rays: No fracture, no osteomyelitis.  - After gaining verbal consent, anesthetized the right foot fourth digit with 20mL of 1% lidocaine plain. Used a sterile suture removal kit to explore the area revealing <1mL of purulence. Used a sterile #10 blade to make a stab incision down to the level of but not beyond subq in the right foot submet 1 area revealing no further purulence. The areas were copiously flushed with sterile saline solution and packed with 1/4 inch iodoform packing, 4x4 gauze, abd pad, and nisha.  - Recommend keeping dressing clean and dry with daily dressing changes with 1/4 inch iodoform packing, sterile 4x4 gauze, and nisha.  - Recommend 1 week PO doxycycline.  - Recommend continuing with anti-fungal cream to both feet daily.  - Recommend crutches to aid with weightbearing as tolerated to the right foot in a DARCO post-op shoe.  - Pt to immediately return to ED if further purulence, redness, or inflammation occurs to the right foot or if there are any other acute issues.  - Pt is stable for discharge from the podiatry standpoint and can followup with Dr. Daniel Uriostegui ([phone]) outpatient on Tuesday 6/20/23.  -Discussed with attending. 27M presents with right foot plantar fourth digit sulcus wound to subq.  - Pt seen and evaluated.  - Afebrile, WBC 10.2, ESR 23, CRP <0.3.  - Right foot plantar fourth digit sulcus wound to subq, mild sanguinous drainage, scant purulence, no erythema, no edema, mild discoloration to the submet 4 area. Left foot no wounds, no acute signs of infection.  - Right Foot X-Rays: No fracture, no osteomyelitis.  - After gaining verbal consent, anesthetized the right foot fourth digit with 20mL of 1% lidocaine plain. Used a sterile suture removal kit to explore the area revealing <1mL of purulence. Used a sterile #10 blade to make a stab incision down to the level of but not beyond subq in the right foot submet 1 area revealing no further purulence. The areas were copiously flushed with sterile saline solution and packed with 1/4 inch iodoform packing, 4x4 gauze, abd pad, and nisha.  - Recommend keeping dressing clean and dry with daily dressing changes with 1/4 inch iodoform packing, sterile 4x4 gauze, and nisha.  - Right Foot Wound: Cultured.  - Recommend 1 week PO doxycycline.  - Recommend continuing with anti-fungal cream to both feet daily.  - Recommend crutches to aid with weightbearing as tolerated to the right foot in a DARCO post-op shoe.  - Pt to immediately return to ED if further purulence, redness, or inflammation occurs to the right foot or if there are any other acute issues.  - Pt is stable for discharge from the podiatry standpoint and can followup with Dr. Daniel Uriostegui ([phone]) outpatient on Tuesday 6/20/23.  -Discussed with attending.

## 2023-06-17 NOTE — ED PROVIDER NOTE - NSICDXPASTSURGICALHX_GEN_ALL_CORE_FT
Writer verified that he has is to take 10,000units daily for 3 months and then cut it back to 5,000units daily.    PAST SURGICAL HISTORY:  No significant past surgical history

## 2023-06-17 NOTE — ED PROVIDER NOTE - PROGRESS NOTE DETAILS
MD Rosemarie (PGY-1) Podiatry consulted. To see patient. MD Rosemarie (PGY-1) Podiatry saw patient. Performed I&D with purulent discharge.

## 2023-06-17 NOTE — ED ADULT TRIAGE NOTE - CHIEF COMPLAINT QUOTE
Pt left AMA from [facility] due to care at facility, pt was receiving treatment for infection/bleeding to 4th digit of Rt foot progressing over the past 3-4 days. Pt has rt foot wrapped with gauze and ortho-slipper.

## 2023-06-17 NOTE — CONSULT NOTE ADULT - SUBJECTIVE AND OBJECTIVE BOX
Patient is a 27y old  Male who presents with a chief complaint of right foot wound.    HPI: 26 yo Male with no significant PMhx, p/w fourth digit toe pain since Friday of last week. Patient came to [facility] ED on 6/11. Discharged with podiatry f/u. Patient was started on augmentin and steroids for infection. Patient states inflammation worsened and Wednesday, patient put pressure on his foot, which resulted in a cut at his MTP joint of fourth toe. Patient reports going to [facility], where he was placed on vanc zosyn. Did not see podiatry there. Patient denies fever, chills, n/v.      PAST MEDICAL & SURGICAL HISTORY:  No pertinent past medical history      No significant past surgical history          MEDICATIONS  (STANDING):    MEDICATIONS  (PRN):      Allergies    No Known Allergies    Intolerances        VITALS:    Vital Signs Last 24 Hrs  T(C): 37 (17 Jun 2023 21:12), Max: 37 (17 Jun 2023 21:12)  T(F): 98.6 (17 Jun 2023 21:12), Max: 98.6 (17 Jun 2023 21:12)  HR: 99 (17 Jun 2023 21:12) (79 - 99)  BP: 152/99 (17 Jun 2023 21:12) (113/85 - 152/99)  BP(mean): --  RR: 18 (17 Jun 2023 21:12) (16 - 18)  SpO2: 99% (17 Jun 2023 21:12) (99% - 99%)    Parameters below as of 17 Jun 2023 21:12  Patient On (Oxygen Delivery Method): room air        LABS:                          14.7   10.20 )-----------( 355      ( 17 Jun 2023 18:30 )             45.2       06-17    143  |  102  |  11  ----------------------------<  94  4.0   |  28  |  0.88    Ca    9.6      17 Jun 2023 18:30    TPro  7.6  /  Alb  4.7  /  TBili  0.7  /  DBili  x   /  AST  15  /  ALT  17  /  AlkPhos  78  06-17      CAPILLARY BLOOD GLUCOSE          PT/INR - ( 17 Jun 2023 18:30 )   PT: 13.2 sec;   INR: 1.14 ratio         PTT - ( 17 Jun 2023 18:30 )  PTT:30.3 sec    LOWER EXTREMITY PHYSICAL EXAM:    Vascular: DP/PT 2/4, B/L, CFT <3 seconds B/L, Temperature gradient warm to cool, B/L.   Neuro: Epicritic sensation intact to the level of digits, B/L.  Musculoskeletal/Ortho: Unremarkable.  Skin: Right foot plantar fourth digit sulcus wound to subq, mild sanguinous drainage, scant purulence, no erythema, no edema, mild discoloration to the submet 4 area. Left foot no wounds, no acute signs of infection.      RADIOLOGY & ADDITIONAL STUDIES:

## 2023-06-17 NOTE — ED PROVIDER NOTE - ATTENDING CONTRIBUTION TO CARE
Brief HPI:  27-year-old male no past medical history presents with right fourth digit toe pain for 8 days.  Patient presented to [facility] ED June 11, 2023 and was discharged with podiatry follow-up on antibiotics for foot and toe wound.  Patient states he followed up with his podiatrist several days later who sent him to outside ED due to worsening swelling and possible infection.  Patient was admitted at outside hospital getting treated with broad-spectrum antibiotics.  Patient left the hospital today due to delayed to be seen by podiatrist.  Patient reports persistent bloody discharge from proximal fourth toe.  Denies trauma, fever, chills, numbness, tingling, weakness.    Vitals:   Reviewed    Exam:    GEN:  Non-toxic appearing, non-distressed, speaking full sentences, non-diaphoretic, AAOx3  HEENT:  NCAT, neck supple, EOMI, PERRLA, sclera anicteric, no conjunctival pallor or injection, no stridor, normal voice, no tonsillar exudate, uvula midline  CV:  regular rhythm and rate, s1/s2 audible, no murmurs, rubs or gallops, peripheral pulses 2+ and symmetric  PULM:  non-labored respirations, lungs clear to auscultation bilaterally, no wheezes, crackles or rales  ABD:  non distended, non-tender, no rebound, no guarding, negative Nelson's sign, bowel sounds normal, no cvat  MSK:  Shallow wounds to webspace between third and fourth digit on right foot, thin sanguinous drainage, no purulent drainage; swelling of fourth digit and distal forefoot; no erythema, no induration, no fluctuance; fourth digit and distal forefoot; can wiggle toes; warm and well-perfused, sensation intact  NEURO:  AAOx3, CN II-XII intact, motor 5/5 in upper and lower extremities bilaterally, sensation grossly intact in extremities and trunk, finger to nose testing wnl, no nystagmus, negative Romberg, no pronator drift, no gait deficit  SKIN:  warm, dry, no rash or vesicles     A/P:  27-year-old male no past medical history presents with right fourth digit toe pain for 8 days.  Vital signs stable, afebrile.  Exam with toe webspace wound with sanguinous drainage.  Possible infection.  Will obtain labs, x-ray, podiatry consult.  Disposition pending.

## 2023-06-17 NOTE — ED PROVIDER NOTE - OBJECTIVE STATEMENT
26 yo Male with no significant PMhx, p/w fourth digit toe pain since Friday of last week. Patient came to [facility] ED on 6/11. Discharged with podiatry f/u. Patient was started on augmentin and steroids for infection. Patient states inflammation worsened and Wednesday, patient put pressure on his foot, which resulted in a cut at his MTP joint of fourth toe. Patient reports going to [facility], where he was placed on vanc zosyn. Did not see podiatry there. Patient denies fever, chills, n/v.

## 2023-06-17 NOTE — ED PROVIDER NOTE - CLINICAL SUMMARY MEDICAL DECISION MAKING FREE TEXT BOX
26 yo Male with no significant PMhx, p/w fourth digit toe pain since Friday of last week. Vitals stable, afebrile. Physical exam with wound to MTP joint. Some oozing of blood noted. The differential diagnosis includes but is not limited to soft tissue infection, osteomyelitis. Does not appear gangrenous. Will get labs, ESR/CRP, XR foot/toe. Podiatry to be consulted once xray back.

## 2023-06-17 NOTE — ED PROVIDER NOTE - NSFOLLOWUPINSTRUCTIONS_ED_ALL_ED_FT
You were seen in the Emergency Department for toe infection. You will be discharged on doxycycline 100 mg twice a day for 7 days.     For pain control, To control your pain at home, you should take Ibuprofen 400 mg along with Tylenol 650mg-1000mg every 6 to 8 hours. Limit your maximum daily Tylenol from all sources to 4000mg. Be aware that many other medications contain acetaminophen which is also known as Tylenol. Taking Tylenol and Ibuprofen together has been shown to be more effective at relieving pain than taking them separately. These are both over the counter medications that you can  at your local pharmacy without a prescription. You need to respect all of the warnings on the bottles. You shouldn’t take these medications for more than a week without following up with your doctor. Both medications come with certain risks and side effects that you need to discuss with your doctor, especially if you are taking them for a prolonged period. You will also be prescribed morphine 10 mg to be take every 8 hours as needed for severe pain.    1) Advance activity as tolerated.   2) Continue all previously prescribed medications as directed.    3) Follow up with your Dr. Daniel Uriostegui on Tuesday - take copies of your results.    4) Return to the Emergency Department for worsening or persistent symptoms, and/or ANY NEW OR CONCERNING SYMPTOMS.

## 2023-06-17 NOTE — ED PROVIDER NOTE - PHYSICAL EXAMINATION
Const: not in acute distress  Eyes: no conjunctival injection  HEENT: Head NCAT.  Neck: Trachea midline.   CVS: +S1/S2, Peripheral pulses 2+ BLE.  RESP: Unlabored respiratory effort.  GI: Nontender/Nondistended,   MSK: No Lower Extremities edema b/l.   Skin: wound to MTP joint of fourth toe.  Neuro: Motor & Sensation grossly intact.  Psych: Awake, cooperative

## 2023-06-17 NOTE — ED ADULT NURSE NOTE - OBJECTIVE STATEMENT
Patient is a 27-year-old male, A&OX3, ambulatory no pertinent medical Hx presenting to the ED c/o right 4th digit toe pain and swelling. Pt says was admitted to [facility] and A'd because did not like the care provided. Pt denies any injuries. Says received IV antibiotics and PO antibiotics. Breathing even and unlabored, chest rise equal b/l. Safety maintained through out.

## 2023-06-17 NOTE — ED ADULT NURSE NOTE - CHIEF COMPLAINT QUOTE
Pt left AMA from [facility] due to care at facility, pt was receiving treatment for infection/bleeding to 4th digit of Rt foot progressing over the past 3-4 days. Pt has rt foot wrapped with gauze and ortho-slipper.

## 2023-06-20 LAB
-  AMPICILLIN/SULBACTAM: SIGNIFICANT CHANGE UP
-  CEFAZOLIN: SIGNIFICANT CHANGE UP
-  CLINDAMYCIN: SIGNIFICANT CHANGE UP
-  DAPTOMYCIN: SIGNIFICANT CHANGE UP
-  ERYTHROMYCIN: SIGNIFICANT CHANGE UP
-  GENTAMICIN: SIGNIFICANT CHANGE UP
-  LINEZOLID: SIGNIFICANT CHANGE UP
-  OXACILLIN: SIGNIFICANT CHANGE UP
-  PENICILLIN: SIGNIFICANT CHANGE UP
-  RIFAMPIN: SIGNIFICANT CHANGE UP
-  TETRACYCLINE: SIGNIFICANT CHANGE UP
-  TRIMETHOPRIM/SULFAMETHOXAZOLE: SIGNIFICANT CHANGE UP
-  VANCOMYCIN: SIGNIFICANT CHANGE UP
METHOD TYPE: SIGNIFICANT CHANGE UP

## 2023-06-21 NOTE — ED POST DISCHARGE NOTE - RESULT SUMMARY
WCX: MRSA Patient discharged home with a prescription for Doxycline which is sensitive message left with Call Back  P.A. number and hours for return call back. Need to inform patient of MRSA results. Call Back  PA to continue to try and contact patient.

## 2023-06-23 LAB
CULTURE RESULTS: SIGNIFICANT CHANGE UP
ORGANISM # SPEC MICROSCOPIC CNT: SIGNIFICANT CHANGE UP
ORGANISM # SPEC MICROSCOPIC CNT: SIGNIFICANT CHANGE UP
SPECIMEN SOURCE: SIGNIFICANT CHANGE UP

## 2023-11-12 ENCOUNTER — EMERGENCY (EMERGENCY)
Facility: HOSPITAL | Age: 28
LOS: 1 days | Discharge: HOME | End: 2023-11-12
Admitting: EMERGENCY MEDICINE
Payer: COMMERCIAL

## 2023-11-12 VITALS
HEART RATE: 78 BPM | RESPIRATION RATE: 16 BRPM | TEMPERATURE: 99 F | OXYGEN SATURATION: 100 % | SYSTOLIC BLOOD PRESSURE: 156 MMHG | DIASTOLIC BLOOD PRESSURE: 94 MMHG

## 2023-11-12 VITALS
OXYGEN SATURATION: 99 % | TEMPERATURE: 99 F | SYSTOLIC BLOOD PRESSURE: 119 MMHG | HEART RATE: 66 BPM | DIASTOLIC BLOOD PRESSURE: 80 MMHG | RESPIRATION RATE: 16 BRPM

## 2023-11-12 LAB
ALBUMIN SERPL ELPH-MCNC: 4.5 G/DL — SIGNIFICANT CHANGE UP (ref 3.3–5)
ALBUMIN SERPL ELPH-MCNC: 4.5 G/DL — SIGNIFICANT CHANGE UP (ref 3.3–5)
ALP SERPL-CCNC: 92 U/L — SIGNIFICANT CHANGE UP (ref 40–120)
ALP SERPL-CCNC: 92 U/L — SIGNIFICANT CHANGE UP (ref 40–120)
ALT FLD-CCNC: 23 U/L — SIGNIFICANT CHANGE UP (ref 4–41)
ALT FLD-CCNC: 23 U/L — SIGNIFICANT CHANGE UP (ref 4–41)
ANION GAP SERPL CALC-SCNC: 15 MMOL/L — HIGH (ref 7–14)
ANION GAP SERPL CALC-SCNC: 15 MMOL/L — HIGH (ref 7–14)
APPEARANCE UR: CLEAR — SIGNIFICANT CHANGE UP
APPEARANCE UR: CLEAR — SIGNIFICANT CHANGE UP
AST SERPL-CCNC: 23 U/L — SIGNIFICANT CHANGE UP (ref 4–40)
AST SERPL-CCNC: 23 U/L — SIGNIFICANT CHANGE UP (ref 4–40)
BASOPHILS # BLD AUTO: 0.03 K/UL — SIGNIFICANT CHANGE UP (ref 0–0.2)
BASOPHILS # BLD AUTO: 0.03 K/UL — SIGNIFICANT CHANGE UP (ref 0–0.2)
BASOPHILS NFR BLD AUTO: 0.3 % — SIGNIFICANT CHANGE UP (ref 0–2)
BASOPHILS NFR BLD AUTO: 0.3 % — SIGNIFICANT CHANGE UP (ref 0–2)
BILIRUB SERPL-MCNC: 0.7 MG/DL — SIGNIFICANT CHANGE UP (ref 0.2–1.2)
BILIRUB SERPL-MCNC: 0.7 MG/DL — SIGNIFICANT CHANGE UP (ref 0.2–1.2)
BILIRUB UR-MCNC: NEGATIVE — SIGNIFICANT CHANGE UP
BILIRUB UR-MCNC: NEGATIVE — SIGNIFICANT CHANGE UP
BLOOD GAS VENOUS COMPREHENSIVE RESULT: SIGNIFICANT CHANGE UP
BLOOD GAS VENOUS COMPREHENSIVE RESULT: SIGNIFICANT CHANGE UP
BUN SERPL-MCNC: 6 MG/DL — LOW (ref 7–23)
BUN SERPL-MCNC: 6 MG/DL — LOW (ref 7–23)
CALCIUM SERPL-MCNC: 10 MG/DL — SIGNIFICANT CHANGE UP (ref 8.4–10.5)
CALCIUM SERPL-MCNC: 10 MG/DL — SIGNIFICANT CHANGE UP (ref 8.4–10.5)
CHLORIDE SERPL-SCNC: 102 MMOL/L — SIGNIFICANT CHANGE UP (ref 98–107)
CHLORIDE SERPL-SCNC: 102 MMOL/L — SIGNIFICANT CHANGE UP (ref 98–107)
CO2 SERPL-SCNC: 24 MMOL/L — SIGNIFICANT CHANGE UP (ref 22–31)
CO2 SERPL-SCNC: 24 MMOL/L — SIGNIFICANT CHANGE UP (ref 22–31)
COLOR SPEC: YELLOW — SIGNIFICANT CHANGE UP
COLOR SPEC: YELLOW — SIGNIFICANT CHANGE UP
CREAT SERPL-MCNC: 0.73 MG/DL — SIGNIFICANT CHANGE UP (ref 0.5–1.3)
CREAT SERPL-MCNC: 0.73 MG/DL — SIGNIFICANT CHANGE UP (ref 0.5–1.3)
DIFF PNL FLD: NEGATIVE — SIGNIFICANT CHANGE UP
DIFF PNL FLD: NEGATIVE — SIGNIFICANT CHANGE UP
EGFR: 129 ML/MIN/1.73M2 — SIGNIFICANT CHANGE UP
EGFR: 129 ML/MIN/1.73M2 — SIGNIFICANT CHANGE UP
EOSINOPHIL # BLD AUTO: 0.05 K/UL — SIGNIFICANT CHANGE UP (ref 0–0.5)
EOSINOPHIL # BLD AUTO: 0.05 K/UL — SIGNIFICANT CHANGE UP (ref 0–0.5)
EOSINOPHIL NFR BLD AUTO: 0.5 % — SIGNIFICANT CHANGE UP (ref 0–6)
EOSINOPHIL NFR BLD AUTO: 0.5 % — SIGNIFICANT CHANGE UP (ref 0–6)
GLUCOSE SERPL-MCNC: 98 MG/DL — SIGNIFICANT CHANGE UP (ref 70–99)
GLUCOSE SERPL-MCNC: 98 MG/DL — SIGNIFICANT CHANGE UP (ref 70–99)
GLUCOSE UR QL: NEGATIVE MG/DL — SIGNIFICANT CHANGE UP
GLUCOSE UR QL: NEGATIVE MG/DL — SIGNIFICANT CHANGE UP
HCT VFR BLD CALC: 46.3 % — SIGNIFICANT CHANGE UP (ref 39–50)
HCT VFR BLD CALC: 46.3 % — SIGNIFICANT CHANGE UP (ref 39–50)
HGB BLD-MCNC: 15.4 G/DL — SIGNIFICANT CHANGE UP (ref 13–17)
HGB BLD-MCNC: 15.4 G/DL — SIGNIFICANT CHANGE UP (ref 13–17)
IANC: 6.5 K/UL — SIGNIFICANT CHANGE UP (ref 1.8–7.4)
IANC: 6.5 K/UL — SIGNIFICANT CHANGE UP (ref 1.8–7.4)
IMM GRANULOCYTES NFR BLD AUTO: 0.3 % — SIGNIFICANT CHANGE UP (ref 0–0.9)
IMM GRANULOCYTES NFR BLD AUTO: 0.3 % — SIGNIFICANT CHANGE UP (ref 0–0.9)
KETONES UR-MCNC: NEGATIVE MG/DL — SIGNIFICANT CHANGE UP
KETONES UR-MCNC: NEGATIVE MG/DL — SIGNIFICANT CHANGE UP
LEUKOCYTE ESTERASE UR-ACNC: NEGATIVE — SIGNIFICANT CHANGE UP
LEUKOCYTE ESTERASE UR-ACNC: NEGATIVE — SIGNIFICANT CHANGE UP
LIDOCAIN IGE QN: 13 U/L — SIGNIFICANT CHANGE UP (ref 7–60)
LIDOCAIN IGE QN: 13 U/L — SIGNIFICANT CHANGE UP (ref 7–60)
LYMPHOCYTES # BLD AUTO: 2.01 K/UL — SIGNIFICANT CHANGE UP (ref 1–3.3)
LYMPHOCYTES # BLD AUTO: 2.01 K/UL — SIGNIFICANT CHANGE UP (ref 1–3.3)
LYMPHOCYTES # BLD AUTO: 22 % — SIGNIFICANT CHANGE UP (ref 13–44)
LYMPHOCYTES # BLD AUTO: 22 % — SIGNIFICANT CHANGE UP (ref 13–44)
MCHC RBC-ENTMCNC: 30.3 PG — SIGNIFICANT CHANGE UP (ref 27–34)
MCHC RBC-ENTMCNC: 30.3 PG — SIGNIFICANT CHANGE UP (ref 27–34)
MCHC RBC-ENTMCNC: 33.3 GM/DL — SIGNIFICANT CHANGE UP (ref 32–36)
MCHC RBC-ENTMCNC: 33.3 GM/DL — SIGNIFICANT CHANGE UP (ref 32–36)
MCV RBC AUTO: 91 FL — SIGNIFICANT CHANGE UP (ref 80–100)
MCV RBC AUTO: 91 FL — SIGNIFICANT CHANGE UP (ref 80–100)
MONOCYTES # BLD AUTO: 0.51 K/UL — SIGNIFICANT CHANGE UP (ref 0–0.9)
MONOCYTES # BLD AUTO: 0.51 K/UL — SIGNIFICANT CHANGE UP (ref 0–0.9)
MONOCYTES NFR BLD AUTO: 5.6 % — SIGNIFICANT CHANGE UP (ref 2–14)
MONOCYTES NFR BLD AUTO: 5.6 % — SIGNIFICANT CHANGE UP (ref 2–14)
NEUTROPHILS # BLD AUTO: 6.5 K/UL — SIGNIFICANT CHANGE UP (ref 1.8–7.4)
NEUTROPHILS # BLD AUTO: 6.5 K/UL — SIGNIFICANT CHANGE UP (ref 1.8–7.4)
NEUTROPHILS NFR BLD AUTO: 71.3 % — SIGNIFICANT CHANGE UP (ref 43–77)
NEUTROPHILS NFR BLD AUTO: 71.3 % — SIGNIFICANT CHANGE UP (ref 43–77)
NITRITE UR-MCNC: NEGATIVE — SIGNIFICANT CHANGE UP
NITRITE UR-MCNC: NEGATIVE — SIGNIFICANT CHANGE UP
NRBC # BLD: 0 /100 WBCS — SIGNIFICANT CHANGE UP (ref 0–0)
NRBC # BLD: 0 /100 WBCS — SIGNIFICANT CHANGE UP (ref 0–0)
NRBC # FLD: 0 K/UL — SIGNIFICANT CHANGE UP (ref 0–0)
NRBC # FLD: 0 K/UL — SIGNIFICANT CHANGE UP (ref 0–0)
PH UR: 6.5 — SIGNIFICANT CHANGE UP (ref 5–8)
PH UR: 6.5 — SIGNIFICANT CHANGE UP (ref 5–8)
PLATELET # BLD AUTO: 316 K/UL — SIGNIFICANT CHANGE UP (ref 150–400)
PLATELET # BLD AUTO: 316 K/UL — SIGNIFICANT CHANGE UP (ref 150–400)
POTASSIUM SERPL-MCNC: 3.6 MMOL/L — SIGNIFICANT CHANGE UP (ref 3.5–5.3)
POTASSIUM SERPL-MCNC: 3.6 MMOL/L — SIGNIFICANT CHANGE UP (ref 3.5–5.3)
POTASSIUM SERPL-SCNC: 3.6 MMOL/L — SIGNIFICANT CHANGE UP (ref 3.5–5.3)
POTASSIUM SERPL-SCNC: 3.6 MMOL/L — SIGNIFICANT CHANGE UP (ref 3.5–5.3)
PROT SERPL-MCNC: 8.4 G/DL — HIGH (ref 6–8.3)
PROT SERPL-MCNC: 8.4 G/DL — HIGH (ref 6–8.3)
PROT UR-MCNC: NEGATIVE MG/DL — SIGNIFICANT CHANGE UP
PROT UR-MCNC: NEGATIVE MG/DL — SIGNIFICANT CHANGE UP
RBC # BLD: 5.09 M/UL — SIGNIFICANT CHANGE UP (ref 4.2–5.8)
RBC # BLD: 5.09 M/UL — SIGNIFICANT CHANGE UP (ref 4.2–5.8)
RBC # FLD: 12.7 % — SIGNIFICANT CHANGE UP (ref 10.3–14.5)
RBC # FLD: 12.7 % — SIGNIFICANT CHANGE UP (ref 10.3–14.5)
SODIUM SERPL-SCNC: 141 MMOL/L — SIGNIFICANT CHANGE UP (ref 135–145)
SODIUM SERPL-SCNC: 141 MMOL/L — SIGNIFICANT CHANGE UP (ref 135–145)
SP GR SPEC: 1.08 — HIGH (ref 1–1.03)
SP GR SPEC: 1.08 — HIGH (ref 1–1.03)
UROBILINOGEN FLD QL: 0.2 MG/DL — SIGNIFICANT CHANGE UP (ref 0.2–1)
UROBILINOGEN FLD QL: 0.2 MG/DL — SIGNIFICANT CHANGE UP (ref 0.2–1)
WBC # BLD: 9.13 K/UL — SIGNIFICANT CHANGE UP (ref 3.8–10.5)
WBC # BLD: 9.13 K/UL — SIGNIFICANT CHANGE UP (ref 3.8–10.5)
WBC # FLD AUTO: 9.13 K/UL — SIGNIFICANT CHANGE UP (ref 3.8–10.5)
WBC # FLD AUTO: 9.13 K/UL — SIGNIFICANT CHANGE UP (ref 3.8–10.5)

## 2023-11-12 PROCEDURE — 99285 EMERGENCY DEPT VISIT HI MDM: CPT

## 2023-11-12 PROCEDURE — 74177 CT ABD & PELVIS W/CONTRAST: CPT | Mod: 26,MA

## 2023-11-12 RX ORDER — CIPROFLOXACIN LACTATE 400MG/40ML
1 VIAL (ML) INTRAVENOUS
Qty: 14 | Refills: 0
Start: 2023-11-12 | End: 2023-11-18

## 2023-11-12 RX ORDER — FAMOTIDINE 10 MG/ML
20 INJECTION INTRAVENOUS ONCE
Refills: 0 | Status: COMPLETED | OUTPATIENT
Start: 2023-11-12 | End: 2023-11-12

## 2023-11-12 RX ORDER — SODIUM CHLORIDE 9 MG/ML
1000 INJECTION INTRAMUSCULAR; INTRAVENOUS; SUBCUTANEOUS ONCE
Refills: 0 | Status: COMPLETED | OUTPATIENT
Start: 2023-11-12 | End: 2023-11-12

## 2023-11-12 RX ORDER — KETOROLAC TROMETHAMINE 30 MG/ML
15 SYRINGE (ML) INJECTION ONCE
Refills: 0 | Status: DISCONTINUED | OUTPATIENT
Start: 2023-11-12 | End: 2023-11-12

## 2023-11-12 RX ADMIN — SODIUM CHLORIDE 1000 MILLILITER(S): 9 INJECTION INTRAMUSCULAR; INTRAVENOUS; SUBCUTANEOUS at 16:21

## 2023-11-12 RX ADMIN — Medication 10 MILLILITER(S): at 17:19

## 2023-11-12 RX ADMIN — Medication 15 MILLIGRAM(S): at 18:26

## 2023-11-12 RX ADMIN — FAMOTIDINE 20 MILLIGRAM(S): 10 INJECTION INTRAVENOUS at 16:21

## 2023-11-12 NOTE — ED PROVIDER NOTE - NSFOLLOWUPINSTRUCTIONS_ED_ALL_ED_FT
Rest, drink plenty of fluids.  Advance activity as tolerated.  Continue all previously prescribed medications as directed.  Follow up with your primary care physician in 48-72 hours- bring copies of your results.  Return to the ER for worsening or persistent symptoms, and/or ANY NEW OR CONCERNING SYMPTOMS. If you have issues obtaining follow up, please call: [phone] to obtain a doctor or specialist who takes your insurance in your area.  You may call [phone] to make an appointment with the internal medicine clinic.

## 2023-11-12 NOTE — ED PROVIDER NOTE - PATIENT PORTAL LINK FT
See refill order    Also, how often is she to giver her son the Miralax? There were no directions. Please advise.   You can access the FollowMyHealth Patient Portal offered by [facility] by registering at the following website: [email or web address]. By joining [employer]’s FollowMyHealth portal, you will also be able to view your health information using other applications (apps) compatible with our system.

## 2023-11-12 NOTE — ED PROVIDER NOTE - CLINICAL SUMMARY MEDICAL DECISION MAKING FREE TEXT BOX
This is a 28-year-old male with no past medical history presented to the emergency room complaining of having abdominal pain along with diarrhea.  He states that he has been having episodes for the past 3 to 4 days mostly watery diarrhea. Abdominal pain-labs, fluids, medications, ct scan and reassess

## 2023-11-12 NOTE — ED ADULT NURSE NOTE - OBJECTIVE STATEMENT
29 y/o M arrives to E.D. intake area c/o abd pain, diarrhea since Thursday. + sick contacts. Denies PMHx. Pt is a&ox4, ambulatory, neg SOB, denies CP, + nausea, denies vomiting, + diarrhea, + subjective fevers. L 20g IV placed to AC. Frequent monitoring in place.

## 2023-11-12 NOTE — ED PROVIDER NOTE - OBJECTIVE STATEMENT
This is a 28-year-old male with no past medical history presented to the emergency room complaining of having abdominal pain along with diarrhea.  He states that he has been having episodes for the past 3 to 4 days mostly watery diarrhea.  He states that he took Imodium which did not give him much relief he denies having any fever chills or body aches denies having any shortness of breath or trouble breathing denies any blood in the stool denies any episodes of nausea vomiting denies having any chest pain or exertional dyspnea.  Patient states that he ate checkers on Thursday and about 4 5 hours later he started having several episodes of diarrhea mostly just watery.

## 2023-11-12 NOTE — ED ADULT NURSE NOTE - NSSEPSISSUSPECTED_ED_A_ED
ED Attending Physician Note      Patient : Meet Larios Age: 39 year old Sex: male   MRN: [id number] Encounter Date: 12/14/2021      History         Chief Complaint   Patient presents with   • Fall   • Shortness of Breath         HPI: 39 year old male presents with shortness of breath and fall.  This 39-year-old male with a history of spina bifida, end-stage renal disease on dialysis, hypertension, morbid obesity, obstructive sleep apnea.  He states that he was feeling short of breath and was reaching for his oxygen when he fell out of bed.  He does take heparin.  He denies headache or neck pain.  He does arrive in a cervical collar.  He states he still feels short of breath even though he is saturating % on nasal cannula.    Allergies   Allergen Reactions   • Latex SHORTNESS OF BREATH   • Clindamycin Other (See Comments)     From SNF record w/out reaction listed       • Felodipine Palpitations   • Morphine PRURITUS   • Plendil Other (See Comments)     unknown       Past Medical History:   Diagnosis Date   • Anemia    • Chronic kidney disease    • Diabetes mellitus (CMS/[facility])    • Essential (primary) hypertension    • Gastroesophageal reflux disease    • High cholesterol    • Sleep apnea        Past Surgical History:   Procedure Laterality Date   • ABDOMEN SURGERY     • CENTRAL LINE  11/21/2021            No family history on file.    Social History     Tobacco Use   • Smoking status: Never Smoker   • Smokeless tobacco: Never Used   Substance Use Topics   • Alcohol use: Not Currently   • Drug use: Never   Nursing home resident    Review of Systems   Constitutional: Negative for diaphoresis.   Eyes: Negative for visual disturbance.   Respiratory: Positive for shortness of breath.    Cardiovascular: Negative for chest pain and palpitations.   Gastrointestinal: Negative for abdominal pain.   Skin: Negative for color change.   Allergic/Immunologic: Negative for immunocompromised state.   Neurological: Negative for  syncope.   All other systems reviewed and are negative.        Physical Exam     ED Triage Vitals [12/14/21 0149]   ED Triage Vitals Group      Temp 97.4 °F (36.3 °C)      Heart Rate 83      Resp 19      /57      SpO2 100 %      EtCO2 mmHg       Height       Weight 227 lb 11.8 oz (103.3 kg)      Weight Scale Used Scale in bed      BMI (Calculated)       IBW/kg (Calculated)        Physical Exam  Vitals and nursing note reviewed.   Constitutional:       General: He is not in acute distress.     Appearance: He is not toxic-appearing or diaphoretic.   HENT:      Head: Normocephalic and atraumatic.      Mouth/Throat:      Comments: Hoarse voice  Eyes:      Extraocular Movements: Extraocular movements intact.      Pupils: Pupils are equal, round, and reactive to light.   Cardiovascular:      Rate and Rhythm: Normal rate. Rhythm irregular.   Pulmonary:      Effort: Pulmonary effort is normal. No tachypnea, accessory muscle usage or respiratory distress.      Breath sounds: No stridor. No decreased breath sounds, wheezing or rhonchi.   Chest:      Chest wall: No mass, deformity or tenderness.   Abdominal:      General: There is no distension.      Palpations: Abdomen is soft. There is no mass.      Tenderness: There is no abdominal tenderness. There is no guarding or rebound.   Musculoskeletal:         General: No deformity.   Skin:     General: Skin is warm.      Capillary Refill: Capillary refill takes less than 2 seconds.      Coloration: Skin is not jaundiced.      Findings: No rash.   Neurological:      Mental Status: He is alert. Mental status is at baseline.   Psychiatric:         Behavior: Behavior normal.     :    ED Course   INITIAL ASSESSMENT: Patient with fall from bed, concern for intracranial bleeding given the anticoagulant use, but I am more concerned about the shortness of breath that caused the patient to reach for his oxygen.  Oxygenating well at this time, I would nevertheless recommend BNP, VBG,  chest imaging.    EKG: EKG shows atrial fibrillation with a rate of 63 without ST segment elevation or depression, narrow QRS complex, no peaked T waves.      Lab Results     Results for orders placed or performed during the hospital encounter of 12/14/21   Basic Metabolic Panel   Result Value Ref Range    Fasting Status      Sodium 137 135 - 145 mmol/L    Potassium 4.7 3.4 - 5.1 mmol/L    Chloride 102 98 - 107 mmol/L    Carbon Dioxide 28 21 - 32 mmol/L    Anion Gap 12 10 - 20 mmol/L    Glucose 122 (H) 70 - 99 mg/dL    BUN 35 (H) 6 - 20 mg/dL    Creatinine 4.26 (H) 0.67 - 1.17 mg/dL    Glomerular Filtration Rate 17 (L) >=60    BUN/ Creatinine Ratio 8 7 - 25    Calcium 9.6 8.4 - 10.2 mg/dL   Blood Gas, Venous   Result Value Ref Range    pH, Venous 7.26 (L) 7.35 - 7.45 Units    pCO2, Venous 64 (H) 41 - 54 mm Hg    pO2, Venous 88 (H) 35 - 42 mm Hg    HCO3, Venous 28 22 - 28 mmol/L    Base Excess/ Deficit, Venous 0 -2 - 2 mmol/L    O2 Saturation, Venous 96 (H) 60 - 80 %    Oxyhemoglobin, Venous 94 (H) 60 - 80 %    Hemoglobin, Blood Gas 9.8 (L) 13.0 - 17.0 g/dL   NT proBNP   Result Value Ref Range    NT-proBNP >70,000 (H) <=125 pg/mL   CBC with Automated Differential (performable only)   Result Value Ref Range    WBC 9.1 4.2 - 11.0 K/mcL    RBC 3.51 (L) 4.50 - 5.90 mil/mcL    HGB 9.7 (L) 13.0 - 17.0 g/dL    HCT 34.2 (L) 39.0 - 51.0 %    MCV 97.4 78.0 - 100.0 fl    MCH 27.6 26.0 - 34.0 pg    MCHC 28.4 (L) 32.0 - 36.5 g/dL    RDW-CV 18.5 (H) 11.0 - 15.0 %    RDW-SD 66.4 (H) 39.0 - 50.0 fL     140 - 450 K/mcL    NRBC 0 <=0 /100 WBC    Neutrophil, Percent 81 %    Lymphocytes, Percent 8 %    Mono, Percent 9 %    Eosinophils, Percent 2 %    Basophils, Percent 0 %    Immature Granulocytes 0 %    Absolute Neutrophils 7.4 1.8 - 7.7 K/mcL    Absolute Lymphocytes 0.7 (L) 1.0 - 4.8 K/mcL    Absolute Monocytes 0.8 0.3 - 0.9 K/mcL    Absolute Eosinophils  0.1 0.0 - 0.5 K/mcL    Absolute Basophils 0.0 0.0 - 0.3 K/mcL    Absolute  Immmature Granulocytes 0.0 0.0 - 0.2 K/mcL   Rapid SARS-CoV-2 by PCR    Specimen: Nasal, Mid-turbinate; Swab   Result Value Ref Range    Rapid SARS-COV-2 by PCR Not Detected Not Detected / Detected / Presumptive Positive / Inhibitors present    Isolation Guidelines      Procedural Comment           Radiology Results     Imaging Results          CT CERVICAL SPINE WO CONTRAST (Final result)  Result time 12/14/21 03:38:04    Final result                 Impression:      Limited exam due to motion.  No evidence of cervical spine fracture or  malalignment.    Electronically Signed by: ELIZABETH SANTACRUZ M.D.   Signed on: 12/14/2021 3:38 AM                Narrative:    Exam: CT cervical spine without contrast.    CLINICAL HISTORY: Neck trauma.    TECHNIQUE: Computed tomographic imaging of the cervical spine was performed  without contrast    Adjustment of the mA and/or kV was done according to the patient's size.    COMPARISON: None.    FINDINGS:    There is a normal variant failure of fusion of the posterior elements of C2  and as well as C5.  The exam is limited by motion but no acute fracture is  evident.  Vertebral body heights and alignment appears normal.  No major  narrowing of the intervertebral disc spaces evident.  There is mild diffuse  sclerosis of the osseous structures.                               CT HEAD WO CONTRAST (Final result)  Result time 12/14/21 03:33:44    Final result                 Impression:      No acute intracranial findings.    Electronically Signed by: ELIZABETH SANTACRUZ M.D.   Signed on: 12/14/2021 3:33 AM                Narrative:    Exam: CT head without contrast.    CLINICAL HISTORY: Head trauma.    TECHNIQUE: Computed tomographic imaging of the head was performed without  contrast.    Adjustment of the mA and/or kV was done according to the patient's size.    COMPARISON: None.    FINDINGS:    The exam is mildly limited by motion.    There is a ventricular shunt catheter terminating  along the septum  pellucidum in the frontal horn of the right lateral ventricle.  There is  mild prominence of the lateral ventricles without overt hydrocephalus.   There is a nonspecific left parietal temporal calcification.  No acute  intracranial hemorrhage or mass effect is evident.  A displaced skull  fracture is not appreciated.  There is mild sphenoid sinus mucosal  thickening.  There is mild generalized cerebral atrophy.                               XR CHEST PA OR AP 1 VIEW (Final result)  Result time 12/14/21 02:20:38    Final result                 Impression:      Reduced lung volumes.    Mild increase in pulmonary vascular congestion or volume overload.    Mild increase in interstitial opacities, likely edema.  Pneumonia is a  possibility.    Electronically Signed by: ELIZABETH SANTACRUZ M.D.   Signed on: 12/14/2021 2:20 AM                Narrative:    Exam: Portable chest x-ray.    CLINICAL HISTORY: Difficulty breathing.  Weakness.    TECHNIQUE: A single portable frontal semiupright view of the chest was  performed.    COMPARISON: Chest x-ray from 12/03/2021.    FINDINGS:    Subtle blunting of the right lateral costophrenic angle is similar,  suggesting a small pleural effusion.  Tubing courses over the right chest,  similar to prior suggesting a ventricular shunt catheter.  Lung volumes are  reduced from the prior examination.  Enlargement of the cardiomediastinal  silhouette appears slightly increased which is probably due to reduced lung  volumes.  Pulmonary vascular congestion or volume overload has increased.   Bilateral interstitial pulmonary opacities have increased as well  suggesting edema or less likely pneumonia.                                      MDM: My impression is that the patient is hypoventilating secondary to combination of obstructive sleep apnea with noncompliance for BiPAP and possibly some hypoventilation relative to body habitus.  He is saturating well but she is not ventilating  well.  We will attempt treatment with BiPAP consistent with sleep apnea treatment.  I expect the VBG will improve and the patient can be admitted for further diuresis to the floor.  If VBG shows worsening hypoventilation the patient may require hospitalization with the ICU for continued positive pressure ventilation.    Clinical Impression        ED Diagnosis   1. Fall from bed, initial encounter     2. Acute pulmonary edema (CMS/HCC)         Disposition        Admit 12/14/2021  4:17 AM  Telemetry Bed?: No  Admitting Physician: CALIXTO BACA [[id number]]  Transferring Patient to? Only adjust for [facility] [facility] [[id number]]  Is this a telephone or verbal order?: This is a telephone order from the admitting physician                           May Noel MD  12/14/21 0424     No

## 2024-01-11 NOTE — ED PROVIDER NOTE - NSFOLLOWUPINSTRUCTIONS_ED_ALL_ED_FT
You were evaluated in the Emergency Department for headache and symptoms after a head injury; you had a CT scan of your head/brain and blood tests (results available in your discharge paperwork).  You were diagnosed with a headache and a concussion.  We recommend you:    1. See a neurologist within the next 2 weeks.  2. See your primary care doctor within the next 3 days.  3. Take Ibuprofen 600mg evry 6 hour as needed for pain/headache.  4. Drink plenty of water to stay hydrated.    5. See the attached sheets for additional information/instructions regarding concussions.    *** Return immediately if you have new or worsening symptoms. *** 50

## 2024-05-08 NOTE — ED ADULT NURSE NOTE - TEMPLATE LIST FOR HEAD TO TOE ASSESSMENT
[FreeTextEntry1] : 1. Constipation: increase water / fiber.  Trial Citrucel  2. History of colon olyp:  Colonoscopy scheduled  Pertinent available records reviewed Risks of the procedure including but not limited to bleeding / perforation / infection / anesthesia complication / missed polyp or lesion explained to the  patient . The patient expressed understanding and a desire to proceed with the procedure.  Risk of not doing procedure includes but is not limited to missed or delayed diagnosis of gastrointestinal pathology. A consultation note was provided to the referring provider
Neuro

## 2024-10-21 NOTE — ED ADULT NURSE NOTE - NS ED NURSE DISCH DISPOSITION
SIRS criteria: Tachypnea and leukocytosis  Initially treated with antibiotics for possible atypical pneumonia in the ED, however procalcitonin is negative and patient examines more like acute on chronic systolic heart failure and denies any fever or productive cough   Hold on further antibiotics at this time  If morning procalcitonin increases-would start ceftriaxone   Follow-up blood cultures   Discharged

## 2025-08-10 ENCOUNTER — EMERGENCY (EMERGENCY)
Facility: HOSPITAL | Age: 30
LOS: 1 days | End: 2025-08-10
Attending: PERSONAL EMERGENCY RESPONSE ATTENDANT | Admitting: STUDENT IN AN ORGANIZED HEALTH CARE EDUCATION/TRAINING PROGRAM
Payer: COMMERCIAL

## 2025-08-10 VITALS
HEART RATE: 83 BPM | OXYGEN SATURATION: 100 % | WEIGHT: 169.98 LBS | SYSTOLIC BLOOD PRESSURE: 128 MMHG | DIASTOLIC BLOOD PRESSURE: 79 MMHG | RESPIRATION RATE: 16 BRPM | TEMPERATURE: 99 F | HEIGHT: 67 IN

## 2025-08-10 LAB
ADD ON TEST-SPECIMEN IN LAB: SIGNIFICANT CHANGE UP
ALBUMIN SERPL ELPH-MCNC: 4.4 G/DL — SIGNIFICANT CHANGE UP (ref 3.3–5)
ALP SERPL-CCNC: 92 U/L — SIGNIFICANT CHANGE UP (ref 40–120)
ALT FLD-CCNC: 20 U/L — SIGNIFICANT CHANGE UP (ref 4–41)
ANION GAP SERPL CALC-SCNC: 15 MMOL/L — HIGH (ref 7–14)
APTT BLD: 34 SEC — SIGNIFICANT CHANGE UP (ref 26.1–36.8)
AST SERPL-CCNC: 27 U/L — SIGNIFICANT CHANGE UP (ref 4–40)
BASOPHILS # BLD AUTO: 0.02 K/UL — SIGNIFICANT CHANGE UP (ref 0–0.2)
BASOPHILS # BLD MANUAL: 0 K/UL — SIGNIFICANT CHANGE UP (ref 0–0.2)
BASOPHILS NFR BLD AUTO: 0.2 % — SIGNIFICANT CHANGE UP (ref 0–2)
BASOPHILS NFR BLD MANUAL: 0 % — SIGNIFICANT CHANGE UP (ref 0–2)
BILIRUB SERPL-MCNC: 1 MG/DL — SIGNIFICANT CHANGE UP (ref 0.2–1.2)
BUN SERPL-MCNC: 6 MG/DL — LOW (ref 7–23)
CALCIUM SERPL-MCNC: 9.5 MG/DL — SIGNIFICANT CHANGE UP (ref 8.4–10.5)
CHLORIDE SERPL-SCNC: 104 MMOL/L — SIGNIFICANT CHANGE UP (ref 98–107)
CO2 SERPL-SCNC: 20 MMOL/L — LOW (ref 22–31)
CREAT SERPL-MCNC: 0.8 MG/DL — SIGNIFICANT CHANGE UP (ref 0.5–1.3)
EGFR: 124 ML/MIN/1.73M2 — SIGNIFICANT CHANGE UP
EGFR: 124 ML/MIN/1.73M2 — SIGNIFICANT CHANGE UP
EOSINOPHIL # BLD AUTO: 0.1 K/UL — SIGNIFICANT CHANGE UP (ref 0–0.5)
EOSINOPHIL # BLD MANUAL: 0 K/UL — SIGNIFICANT CHANGE UP (ref 0–0.5)
EOSINOPHIL NFR BLD AUTO: 1.1 % — SIGNIFICANT CHANGE UP (ref 0–6)
EOSINOPHIL NFR BLD MANUAL: 0 % — SIGNIFICANT CHANGE UP (ref 0–6)
FLUAV AG NPH QL: SIGNIFICANT CHANGE UP
FLUBV AG NPH QL: SIGNIFICANT CHANGE UP
GIANT PLATELETS BLD QL SMEAR: PRESENT
GLUCOSE SERPL-MCNC: 110 MG/DL — HIGH (ref 70–99)
HCT VFR BLD CALC: 40.7 % — SIGNIFICANT CHANGE UP (ref 39–50)
HCT VFR BLD CALC: 42 % — SIGNIFICANT CHANGE UP (ref 39–50)
HCT VFR BLD CALC: 44 % — SIGNIFICANT CHANGE UP (ref 39–50)
HGB BLD-MCNC: 13.7 G/DL — SIGNIFICANT CHANGE UP (ref 13–17)
HGB BLD-MCNC: 14.1 G/DL — SIGNIFICANT CHANGE UP (ref 13–17)
HGB BLD-MCNC: 15.1 G/DL — SIGNIFICANT CHANGE UP (ref 13–17)
IMM GRANULOCYTES # BLD AUTO: 0.03 K/UL — SIGNIFICANT CHANGE UP (ref 0–0.07)
IMM GRANULOCYTES NFR BLD AUTO: 0.3 % — SIGNIFICANT CHANGE UP (ref 0–0.9)
INR BLD: 1.14 RATIO — SIGNIFICANT CHANGE UP (ref 0.85–1.16)
LIDOCAIN IGE QN: 16 U/L — SIGNIFICANT CHANGE UP (ref 7–60)
LYMPHOCYTES # BLD AUTO: 0.49 K/UL — LOW (ref 1–3.3)
LYMPHOCYTES # BLD MANUAL: 0.32 K/UL — LOW (ref 1–3.3)
LYMPHOCYTES NFR BLD AUTO: 5.3 % — LOW (ref 13–44)
LYMPHOCYTES NFR BLD MANUAL: 3.5 % — LOW (ref 13–44)
MAGNESIUM SERPL-MCNC: 1.8 MG/DL — SIGNIFICANT CHANGE UP (ref 1.6–2.6)
MANUAL REACTIVE LYMPHOCYTES #: 0.08 K/UL — SIGNIFICANT CHANGE UP (ref 0–0.63)
MCHC RBC-ENTMCNC: 30.8 PG — SIGNIFICANT CHANGE UP (ref 27–34)
MCHC RBC-ENTMCNC: 30.8 PG — SIGNIFICANT CHANGE UP (ref 27–34)
MCHC RBC-ENTMCNC: 30.9 PG — SIGNIFICANT CHANGE UP (ref 27–34)
MCHC RBC-ENTMCNC: 33.6 G/DL — SIGNIFICANT CHANGE UP (ref 32–36)
MCHC RBC-ENTMCNC: 33.7 G/DL — SIGNIFICANT CHANGE UP (ref 32–36)
MCHC RBC-ENTMCNC: 34.3 G/DL — SIGNIFICANT CHANGE UP (ref 32–36)
MCV RBC AUTO: 89.8 FL — SIGNIFICANT CHANGE UP (ref 80–100)
MCV RBC AUTO: 91.7 FL — SIGNIFICANT CHANGE UP (ref 80–100)
MCV RBC AUTO: 91.7 FL — SIGNIFICANT CHANGE UP (ref 80–100)
MONOCYTES # BLD AUTO: 0.25 K/UL — SIGNIFICANT CHANGE UP (ref 0–0.9)
MONOCYTES # BLD MANUAL: 0 K/UL — SIGNIFICANT CHANGE UP (ref 0–0.9)
MONOCYTES NFR BLD AUTO: 2.7 % — SIGNIFICANT CHANGE UP (ref 2–14)
MONOCYTES NFR BLD MANUAL: 0 % — LOW (ref 2–14)
NEUTROPHILS # BLD AUTO: 8.3 K/UL — HIGH (ref 1.8–7.4)
NEUTROPHILS # BLD MANUAL: 8.79 K/UL — HIGH (ref 1.8–7.4)
NEUTROPHILS NFR BLD AUTO: 90.4 % — HIGH (ref 43–77)
NEUTROPHILS NFR BLD MANUAL: 95.6 % — HIGH (ref 43–77)
NRBC # BLD AUTO: 0 K/UL — SIGNIFICANT CHANGE UP (ref 0–0)
NRBC # FLD: 0 K/UL — SIGNIFICANT CHANGE UP (ref 0–0)
NRBC BLD AUTO-RTO: 0 /100 WBCS — SIGNIFICANT CHANGE UP (ref 0–0)
PLAT MORPH BLD: NORMAL — SIGNIFICANT CHANGE UP
PLATELET # BLD AUTO: 242 K/UL — SIGNIFICANT CHANGE UP (ref 150–400)
PLATELET # BLD AUTO: 251 K/UL — SIGNIFICANT CHANGE UP (ref 150–400)
PLATELET # BLD AUTO: 295 K/UL — SIGNIFICANT CHANGE UP (ref 150–400)
PLATELET COUNT - ESTIMATE: NORMAL — SIGNIFICANT CHANGE UP
PMV BLD: 10 FL — SIGNIFICANT CHANGE UP (ref 7–13)
PMV BLD: 9.7 FL — SIGNIFICANT CHANGE UP (ref 7–13)
PMV BLD: 9.8 FL — SIGNIFICANT CHANGE UP (ref 7–13)
POTASSIUM SERPL-MCNC: 3.8 MMOL/L — SIGNIFICANT CHANGE UP (ref 3.5–5.3)
POTASSIUM SERPL-SCNC: 3.8 MMOL/L — SIGNIFICANT CHANGE UP (ref 3.5–5.3)
PROT SERPL-MCNC: 8.1 G/DL — SIGNIFICANT CHANGE UP (ref 6–8.3)
PROTHROM AB SERPL-ACNC: 13.2 SEC — SIGNIFICANT CHANGE UP (ref 9.9–13.4)
RBC # BLD: 4.44 M/UL — SIGNIFICANT CHANGE UP (ref 4.2–5.8)
RBC # BLD: 4.58 M/UL — SIGNIFICANT CHANGE UP (ref 4.2–5.8)
RBC # BLD: 4.9 M/UL — SIGNIFICANT CHANGE UP (ref 4.2–5.8)
RBC # FLD: 12.9 % — SIGNIFICANT CHANGE UP (ref 10.3–14.5)
RBC # FLD: 13 % — SIGNIFICANT CHANGE UP (ref 10.3–14.5)
RBC # FLD: 13.1 % — SIGNIFICANT CHANGE UP (ref 10.3–14.5)
RBC BLD AUTO: NORMAL — SIGNIFICANT CHANGE UP
RSV RNA NPH QL NAA+NON-PROBE: SIGNIFICANT CHANGE UP
SARS-COV-2 RNA SPEC QL NAA+PROBE: SIGNIFICANT CHANGE UP
SODIUM SERPL-SCNC: 139 MMOL/L — SIGNIFICANT CHANGE UP (ref 135–145)
SOURCE RESPIRATORY: SIGNIFICANT CHANGE UP
VARIANT LYMPHS # BLD: 0.9 % — SIGNIFICANT CHANGE UP (ref 0–6)
VARIANT LYMPHS NFR BLD MANUAL: 0.9 % — SIGNIFICANT CHANGE UP (ref 0–6)
WBC # BLD: 7.12 K/UL — SIGNIFICANT CHANGE UP (ref 3.8–10.5)
WBC # BLD: 8.1 K/UL — SIGNIFICANT CHANGE UP (ref 3.8–10.5)
WBC # BLD: 9.19 K/UL — SIGNIFICANT CHANGE UP (ref 3.8–10.5)
WBC # FLD AUTO: 7.12 K/UL — SIGNIFICANT CHANGE UP (ref 3.8–10.5)
WBC # FLD AUTO: 8.1 K/UL — SIGNIFICANT CHANGE UP (ref 3.8–10.5)
WBC # FLD AUTO: 9.19 K/UL — SIGNIFICANT CHANGE UP (ref 3.8–10.5)

## 2025-08-10 PROCEDURE — 71046 X-RAY EXAM CHEST 2 VIEWS: CPT | Mod: 26

## 2025-08-10 PROCEDURE — 70450 CT HEAD/BRAIN W/O DYE: CPT | Mod: 26

## 2025-08-10 PROCEDURE — 99223 1ST HOSP IP/OBS HIGH 75: CPT

## 2025-08-10 RX ORDER — ACETAMINOPHEN 500 MG/5ML
1000 LIQUID (ML) ORAL ONCE
Refills: 0 | Status: COMPLETED | OUTPATIENT
Start: 2025-08-10 | End: 2025-08-10

## 2025-08-10 RX ORDER — METOCLOPRAMIDE HCL 10 MG
10 TABLET ORAL ONCE
Refills: 0 | Status: COMPLETED | OUTPATIENT
Start: 2025-08-10 | End: 2025-08-10

## 2025-08-10 RX ADMIN — Medication 20 MILLIGRAM(S): at 10:48

## 2025-08-10 RX ADMIN — Medication 400 MILLIGRAM(S): at 19:09

## 2025-08-10 RX ADMIN — Medication 400 MILLIGRAM(S): at 10:48

## 2025-08-10 RX ADMIN — Medication 10 MILLIGRAM(S): at 19:10

## 2025-08-10 RX ADMIN — Medication 1000 MILLILITER(S): at 19:09

## 2025-08-10 RX ADMIN — Medication 1000 MILLILITER(S): at 10:51

## 2025-08-11 VITALS
OXYGEN SATURATION: 99 % | RESPIRATION RATE: 15 BRPM | HEART RATE: 84 BPM | TEMPERATURE: 98 F | SYSTOLIC BLOOD PRESSURE: 121 MMHG | DIASTOLIC BLOOD PRESSURE: 71 MMHG

## 2025-08-11 LAB
ALBUMIN SERPL ELPH-MCNC: 4 G/DL — SIGNIFICANT CHANGE UP (ref 3.3–5)
ALP SERPL-CCNC: 79 U/L — SIGNIFICANT CHANGE UP (ref 40–120)
ALT FLD-CCNC: 16 U/L — SIGNIFICANT CHANGE UP (ref 4–41)
ANION GAP SERPL CALC-SCNC: 14 MMOL/L — SIGNIFICANT CHANGE UP (ref 7–14)
AST SERPL-CCNC: 20 U/L — SIGNIFICANT CHANGE UP (ref 4–40)
BASOPHILS # BLD AUTO: 0.03 K/UL — SIGNIFICANT CHANGE UP (ref 0–0.2)
BASOPHILS NFR BLD AUTO: 0.5 % — SIGNIFICANT CHANGE UP (ref 0–2)
BILIRUB SERPL-MCNC: 0.8 MG/DL — SIGNIFICANT CHANGE UP (ref 0.2–1.2)
BUN SERPL-MCNC: 6 MG/DL — LOW (ref 7–23)
CALCIUM SERPL-MCNC: 8.8 MG/DL — SIGNIFICANT CHANGE UP (ref 8.4–10.5)
CHLORIDE SERPL-SCNC: 105 MMOL/L — SIGNIFICANT CHANGE UP (ref 98–107)
CO2 SERPL-SCNC: 20 MMOL/L — LOW (ref 22–31)
CREAT SERPL-MCNC: 0.77 MG/DL — SIGNIFICANT CHANGE UP (ref 0.5–1.3)
EGFR: 125 ML/MIN/1.73M2 — SIGNIFICANT CHANGE UP
EGFR: 125 ML/MIN/1.73M2 — SIGNIFICANT CHANGE UP
EOSINOPHIL # BLD AUTO: 0.34 K/UL — SIGNIFICANT CHANGE UP (ref 0–0.5)
EOSINOPHIL NFR BLD AUTO: 5.7 % — SIGNIFICANT CHANGE UP (ref 0–6)
GLUCOSE SERPL-MCNC: 103 MG/DL — HIGH (ref 70–99)
HCT VFR BLD CALC: 42 % — SIGNIFICANT CHANGE UP (ref 39–50)
HGB BLD-MCNC: 14 G/DL — SIGNIFICANT CHANGE UP (ref 13–17)
IMM GRANULOCYTES # BLD AUTO: 0.02 K/UL — SIGNIFICANT CHANGE UP (ref 0–0.07)
IMM GRANULOCYTES NFR BLD AUTO: 0.3 % — SIGNIFICANT CHANGE UP (ref 0–0.9)
LYMPHOCYTES # BLD AUTO: 2.01 K/UL — SIGNIFICANT CHANGE UP (ref 1–3.3)
LYMPHOCYTES NFR BLD AUTO: 33.8 % — SIGNIFICANT CHANGE UP (ref 13–44)
MCHC RBC-ENTMCNC: 30.5 PG — SIGNIFICANT CHANGE UP (ref 27–34)
MCHC RBC-ENTMCNC: 33.3 G/DL — SIGNIFICANT CHANGE UP (ref 32–36)
MCV RBC AUTO: 91.5 FL — SIGNIFICANT CHANGE UP (ref 80–100)
MONOCYTES # BLD AUTO: 0.37 K/UL — SIGNIFICANT CHANGE UP (ref 0–0.9)
MONOCYTES NFR BLD AUTO: 6.2 % — SIGNIFICANT CHANGE UP (ref 2–14)
NEUTROPHILS # BLD AUTO: 3.17 K/UL — SIGNIFICANT CHANGE UP (ref 1.8–7.4)
NEUTROPHILS NFR BLD AUTO: 53.5 % — SIGNIFICANT CHANGE UP (ref 43–77)
NRBC # BLD AUTO: 0 K/UL — SIGNIFICANT CHANGE UP (ref 0–0)
NRBC # FLD: 0 K/UL — SIGNIFICANT CHANGE UP (ref 0–0)
NRBC BLD AUTO-RTO: 0 /100 WBCS — SIGNIFICANT CHANGE UP (ref 0–0)
PLATELET # BLD AUTO: 244 K/UL — SIGNIFICANT CHANGE UP (ref 150–400)
PMV BLD: 10 FL — SIGNIFICANT CHANGE UP (ref 7–13)
POTASSIUM SERPL-MCNC: 3.6 MMOL/L — SIGNIFICANT CHANGE UP (ref 3.5–5.3)
POTASSIUM SERPL-SCNC: 3.6 MMOL/L — SIGNIFICANT CHANGE UP (ref 3.5–5.3)
PROT SERPL-MCNC: 7.1 G/DL — SIGNIFICANT CHANGE UP (ref 6–8.3)
RBC # BLD: 4.59 M/UL — SIGNIFICANT CHANGE UP (ref 4.2–5.8)
RBC # FLD: 13.1 % — SIGNIFICANT CHANGE UP (ref 10.3–14.5)
SODIUM SERPL-SCNC: 139 MMOL/L — SIGNIFICANT CHANGE UP (ref 135–145)
WBC # BLD: 5.94 K/UL — SIGNIFICANT CHANGE UP (ref 3.8–10.5)
WBC # FLD AUTO: 5.94 K/UL — SIGNIFICANT CHANGE UP (ref 3.8–10.5)

## 2025-08-11 PROCEDURE — 99239 HOSP IP/OBS DSCHRG MGMT >30: CPT

## 2025-08-14 ENCOUNTER — APPOINTMENT (OUTPATIENT)
Dept: ORTHOPEDIC SURGERY | Facility: CLINIC | Age: 30
End: 2025-08-14
Payer: COMMERCIAL

## 2025-08-14 DIAGNOSIS — S61.112A LACERATION W/OUT FOREIGN BODY OF LEFT THUMB WITH DAMAGE TO NAIL, INITIAL ENCOUNTER: ICD-10-CM

## 2025-08-14 PROCEDURE — 99203 OFFICE O/P NEW LOW 30 MIN: CPT

## 2025-08-14 PROCEDURE — 73140 X-RAY EXAM OF FINGER(S): CPT | Mod: LT

## 2025-09-03 ENCOUNTER — APPOINTMENT (OUTPATIENT)
Dept: ORTHOPEDIC SURGERY | Facility: CLINIC | Age: 30
End: 2025-09-03
Payer: COMMERCIAL

## 2025-09-03 DIAGNOSIS — S61.112A LACERATION W/OUT FOREIGN BODY OF LEFT THUMB WITH DAMAGE TO NAIL, INITIAL ENCOUNTER: ICD-10-CM

## 2025-09-03 PROCEDURE — 99212 OFFICE O/P EST SF 10 MIN: CPT
